# Patient Record
Sex: FEMALE | Race: WHITE | HISPANIC OR LATINO | Employment: UNEMPLOYED | RURAL
[De-identification: names, ages, dates, MRNs, and addresses within clinical notes are randomized per-mention and may not be internally consistent; named-entity substitution may affect disease eponyms.]

---

## 2021-01-18 ENCOUNTER — TELEPHONE (OUTPATIENT)
Dept: FAMILY MEDICINE CLINIC | Facility: CLINIC | Age: 26
End: 2021-01-18

## 2021-01-18 NOTE — TELEPHONE ENCOUNTER
Tied calling to confirm patient is establishing care with Dr Rangel needs to wear facemask and prescreen,  Also needs to be remind to bring photo id, ins card medication (RX or OTC) and name and phone number of any provider seen within the last 2 years.  No answer no voicemail   HUB to read

## 2021-01-19 ENCOUNTER — OFFICE VISIT (OUTPATIENT)
Dept: FAMILY MEDICINE CLINIC | Facility: CLINIC | Age: 26
End: 2021-01-19

## 2021-01-19 VITALS
OXYGEN SATURATION: 98 % | HEIGHT: 64 IN | RESPIRATION RATE: 20 BRPM | SYSTOLIC BLOOD PRESSURE: 110 MMHG | DIASTOLIC BLOOD PRESSURE: 82 MMHG | TEMPERATURE: 97.3 F | BODY MASS INDEX: 38.58 KG/M2 | WEIGHT: 226 LBS | HEART RATE: 103 BPM

## 2021-01-19 DIAGNOSIS — N93.9 ABNORMAL UTERINE BLEEDING (AUB): Primary | ICD-10-CM

## 2021-01-19 LAB
B-HCG UR QL: NEGATIVE
INTERNAL NEGATIVE CONTROL: NEGATIVE
INTERNAL POSITIVE CONTROL: NEGATIVE
Lab: NORMAL

## 2021-01-19 PROCEDURE — 87660 TRICHOMONAS VAGIN DIR PROBE: CPT | Performed by: STUDENT IN AN ORGANIZED HEALTH CARE EDUCATION/TRAINING PROGRAM

## 2021-01-19 PROCEDURE — 87510 GARDNER VAG DNA DIR PROBE: CPT | Performed by: STUDENT IN AN ORGANIZED HEALTH CARE EDUCATION/TRAINING PROGRAM

## 2021-01-19 PROCEDURE — 99203 OFFICE O/P NEW LOW 30 MIN: CPT | Performed by: STUDENT IN AN ORGANIZED HEALTH CARE EDUCATION/TRAINING PROGRAM

## 2021-01-19 PROCEDURE — 87480 CANDIDA DNA DIR PROBE: CPT | Performed by: STUDENT IN AN ORGANIZED HEALTH CARE EDUCATION/TRAINING PROGRAM

## 2021-01-19 PROCEDURE — 81025 URINE PREGNANCY TEST: CPT | Performed by: STUDENT IN AN ORGANIZED HEALTH CARE EDUCATION/TRAINING PROGRAM

## 2021-01-19 NOTE — PROGRESS NOTES
"   Subjective:  Christ Sanchez is a 25 y.o. female who presents for establish care and AUB    AUB; states usually very regular with periods, LMP was -, states had coitus friday morning and afternoon, has some faint pink discharge and intermittent blot clot over the weekend. States that this was unusual for her, has begun her period.  Denied dyspareunia, dysuria, no vaginal discharge, no abdominal pain. States had her tubes tied after her last pregnancy ~ 5 years ago.  States last pap smear was then as well. Has been with  for 9 years, monogamous.      Vitals:    Vitals:    21 1436   BP: 110/82   Pulse: 103   Resp: 20   Temp: 97.3 °F (36.3 °C)   SpO2: 98%   Weight: 103 kg (226 lb)   Height: 162.6 cm (64\")       Body mass index is 38.79 kg/m².    Review of Systems  Review of Systems   Constitutional: Negative for appetite change and fever.   HENT: Negative for sore throat.    Eyes: Negative for discharge and visual disturbance.   Respiratory: Negative for cough and shortness of breath.    Cardiovascular: Negative for chest pain, palpitations and leg swelling.   Gastrointestinal: Negative for abdominal pain, diarrhea and vomiting.   Genitourinary: Negative for dysuria.   Skin: Negative for rash.   Neurological: Negative for light-headedness and headaches.   Psychiatric/Behavioral: Negative for agitation.       There is no problem list on file for this patient.    Past Surgical History:   Procedure Laterality Date   •  SECTION      x2     Social History     Socioeconomic History   • Marital status:      Spouse name: Not on file   • Number of children: Not on file   • Years of education: Not on file   • Highest education level: Not on file   Tobacco Use   • Smoking status: Never Smoker   • Smokeless tobacco: Never Used   Substance and Sexual Activity   • Alcohol use: Never     Frequency: Never   • Drug use: Never     History reviewed. No pertinent family history.  No results found " for any previous visit.      No image results found.    @[facility]@    There is no immunization history on file for this patient.    The following portions of the patient's history were reviewed and updated as appropriate: allergies, current medications, past family history, past medical history, past social history, past surgical history and problem list.        Physical Exam  Physical Exam  Constitutional:       Appearance: Normal appearance.   HENT:      Head: Normocephalic and atraumatic.      Right Ear: Tympanic membrane and ear canal normal.      Left Ear: Tympanic membrane and ear canal normal.   Eyes:      General:         Right eye: No discharge.         Left eye: No discharge.      Conjunctiva/sclera: Conjunctivae normal.   Neck:      Musculoskeletal: Normal range of motion.   Cardiovascular:      Rate and Rhythm: Normal rate and regular rhythm.      Pulses: Normal pulses.      Heart sounds: Normal heart sounds. No murmur.   Pulmonary:      Effort: Pulmonary effort is normal. No respiratory distress.      Breath sounds: Normal breath sounds.   Abdominal:      General: There is no distension.      Palpations: Abdomen is soft.      Tenderness: There is no abdominal tenderness.   Genitourinary:     Comments: Deferred secondary to children in room.  Lymphadenopathy:      Cervical: No cervical adenopathy.   Neurological:      Mental Status: She is alert. Mental status is at baseline.   Psychiatric:         Mood and Affect: Mood normal.         Behavior: Behavior normal.         Assessment/Plan    Diagnosis Plan   1. Abnormal uterine bleeding (AUB)  POCT pregnancy, urine    Chlamydia trachomatis, Neisseria gonorrhoeae, PCR - Urine, Urine, Clean Catch    Gardnerella vaginalis, Trichomonas vaginalis, Candida albicans, DNA - Swab, Vagina      Orders Placed This Encounter   Procedures   • Chlamydia trachomatis, Neisseria gonorrhoeae, PCR - Urine, Urine, Clean Catch   • Gardnerella vaginalis, Trichomonas vaginalis,  Candida albicans, DNA - Swab, Vagina   • POCT pregnancy, urine     Patient unable to do Pap smear,  exam today as children are with her.  No flank pain, abdominal pain, vaginal discharge, UPT today was negative.  Will refer to OB/GYN, if unable to see them promptly she is to schedule appointment for follow-up at our clinic for Pap smear,  exam.  Self swab as above to rule out STIs, patient agreeable to plan, no red flags.      This document has been electronically signed by Lawrence Rangel MD on January 19, 2021 15:52 CST

## 2021-01-20 LAB
CANDIDA ALBICANS: NEGATIVE
GARDNERELLA VAGINALIS: POSITIVE
T VAGINALIS DNA VAG QL PROBE+SIG AMP: NEGATIVE

## 2021-01-20 PROCEDURE — 87491 CHLMYD TRACH DNA AMP PROBE: CPT | Performed by: STUDENT IN AN ORGANIZED HEALTH CARE EDUCATION/TRAINING PROGRAM

## 2021-01-20 PROCEDURE — 87591 N.GONORRHOEAE DNA AMP PROB: CPT | Performed by: STUDENT IN AN ORGANIZED HEALTH CARE EDUCATION/TRAINING PROGRAM

## 2021-01-20 PROCEDURE — 87661 TRICHOMONAS VAGINALIS AMPLIF: CPT | Performed by: STUDENT IN AN ORGANIZED HEALTH CARE EDUCATION/TRAINING PROGRAM

## 2021-01-21 DIAGNOSIS — B96.89 BV (BACTERIAL VAGINOSIS): Primary | ICD-10-CM

## 2021-01-21 DIAGNOSIS — N76.0 BV (BACTERIAL VAGINOSIS): Primary | ICD-10-CM

## 2021-01-21 LAB
C TRACH RRNA CVX QL NAA+PROBE: NEGATIVE
N GONORRHOEA RRNA SPEC QL NAA+PROBE: NEGATIVE
TRICHOMONAS VAGINALIS PCR: NEGATIVE

## 2021-01-21 RX ORDER — METRONIDAZOLE 500 MG/1
500 TABLET ORAL 2 TIMES DAILY
Qty: 14 TABLET | Refills: 0 | Status: SHIPPED | OUTPATIENT
Start: 2021-01-21 | End: 2021-03-31

## 2021-02-08 ENCOUNTER — TELEPHONE (OUTPATIENT)
Dept: FAMILY MEDICINE CLINIC | Facility: CLINIC | Age: 26
End: 2021-02-08

## 2021-03-31 ENCOUNTER — OFFICE VISIT (OUTPATIENT)
Dept: OBSTETRICS AND GYNECOLOGY | Facility: CLINIC | Age: 26
End: 2021-03-31

## 2021-03-31 VITALS
WEIGHT: 230 LBS | BODY MASS INDEX: 39.27 KG/M2 | SYSTOLIC BLOOD PRESSURE: 120 MMHG | DIASTOLIC BLOOD PRESSURE: 82 MMHG | HEIGHT: 64 IN

## 2021-03-31 DIAGNOSIS — N92.6 MISSED PERIOD: ICD-10-CM

## 2021-03-31 DIAGNOSIS — Z01.419 ENCOUNTER FOR GYNECOLOGICAL EXAMINATION WITHOUT ABNORMAL FINDING: Primary | ICD-10-CM

## 2021-03-31 LAB
B-HCG UR QL: NEGATIVE
INTERNAL NEGATIVE CONTROL: NEGATIVE
INTERNAL POSITIVE CONTROL: POSITIVE
Lab: NORMAL

## 2021-03-31 PROCEDURE — 99385 PREV VISIT NEW AGE 18-39: CPT | Performed by: NURSE PRACTITIONER

## 2021-03-31 PROCEDURE — 81025 URINE PREGNANCY TEST: CPT | Performed by: NURSE PRACTITIONER

## 2021-03-31 NOTE — PROGRESS NOTES
Subjective   Christ Sanchez is a 26 y.o. presents for annual exam. States that her periods are usually 30 days apart but she has not had one in about 50 days.     LMP- 2/10/21  Last pap-  in [place]; no hx of abnormal  Last mammo- never 20 age    Moved to the  in November with her  and two children. States that he got a job in [place] after being unemployed in [place] for several months. States that she is no longer having to work since moving here and her two children are in school full-time. She is enjoying her new home and feels much less stressed than she was in [place].    Gynecologic Exam  The patient's pertinent negatives include no genital itching, genital lesions, genital odor, genital rash, missed menses, pelvic pain, vaginal bleeding or vaginal discharge. Pertinent negatives include no abdominal pain, chills, constipation, diarrhea, dysuria, fever, frequency or urgency. She is sexually active. No, her partner does not have an STD. She uses tubal ligation for contraception. Her menstrual history has been regular. Her past medical history is significant for a  section and miscarriage. There is no history of ovarian cysts or an STD.       The following portions of the patient's history were reviewed and updated as appropriate: allergies, current medications, past family history, past medical history, past social history, past surgical history and problem list.    Review of Systems   Constitutional: Negative for activity change, appetite change, chills, diaphoresis, fatigue, fever, unexpected weight gain and unexpected weight loss.   Respiratory: Negative for chest tightness and shortness of breath.    Cardiovascular: Negative for chest pain and palpitations.   Gastrointestinal: Negative for abdominal distention, abdominal pain, constipation and diarrhea.   Endocrine: Negative.    Genitourinary: Positive for menstrual problem. Negative for amenorrhea, breast discharge, breast lump, breast  pain, decreased libido, difficulty urinating, dyspareunia, dysuria, frequency, missed menses, pelvic pain, pelvic pressure, urgency, urinary incontinence, vaginal bleeding, vaginal discharge and vaginal pain.   Musculoskeletal: Negative for myalgias.   Skin: Negative for color change, dry skin and skin lesions.   Neurological: Negative for light-headedness and headache.   Psychiatric/Behavioral: Negative for agitation, dysphoric mood, sleep disturbance, depressed mood and stress. The patient is not nervous/anxious.        Objective   Physical Exam  Vitals and nursing note reviewed. Exam conducted with a chaperone present.   Constitutional:       General: She is awake. She is not in acute distress.     Appearance: Normal appearance. She is well-developed and well-groomed. She is obese. She is not ill-appearing, toxic-appearing or diaphoretic.   Neck:      Thyroid: No thyroid mass, thyromegaly or thyroid tenderness.   Cardiovascular:      Rate and Rhythm: Normal rate and regular rhythm.      Heart sounds: Normal heart sounds.   Pulmonary:      Effort: Pulmonary effort is normal.      Breath sounds: Normal breath sounds.   Chest:      Breasts: Rashad Score is 5. Breasts are symmetrical.         Right: Normal. No swelling, bleeding, inverted nipple, mass, nipple discharge, skin change or tenderness.         Left: Normal. No swelling, bleeding, inverted nipple, mass, nipple discharge, skin change or tenderness.   Abdominal:      General: Bowel sounds are normal. There is no distension.      Palpations: Abdomen is soft.      Tenderness: There is no abdominal tenderness.   Genitourinary:     General: Normal vulva.      Exam position: Lithotomy position.      Rashad stage (genital): 5.      Labia:         Right: No rash, tenderness, lesion or injury.         Left: No rash, tenderness, lesion or injury.       Urethra: No prolapse, urethral pain, urethral swelling or urethral lesion.      Vagina: Normal.      Cervix: Normal.       Uterus: Normal.       Adnexa: Right adnexa normal and left adnexa normal.        Right: No mass, tenderness or fullness.          Left: No mass, tenderness or fullness.        Comments: Pap obtained  Lymphadenopathy:      Upper Body:      Right upper body: No supraclavicular, axillary or pectoral adenopathy.      Left upper body: No supraclavicular, axillary or pectoral adenopathy.      Lower Body: No right inguinal adenopathy. No left inguinal adenopathy.   Skin:     General: Skin is warm and dry.   Neurological:      Mental Status: She is alert and oriented to person, place, and time.      Gait: Gait is intact.   Psychiatric:         Attention and Perception: Attention and perception normal.         Mood and Affect: Mood and affect normal.         Speech: Speech normal.         Behavior: Behavior normal. Behavior is cooperative.           Assessment/Plan   Diagnoses and all orders for this visit:    1. Encounter for gynecological examination without abnormal finding (Primary)  -     Liquid-based Pap Smear, Screening    2. Missed period  -     POC Pregnancy, Urine      UPT is negative in office today. If periods continue to be irregular she is to RTC, otherwise give it a few months to regulate again. May just be off due to the recent move. Reviewed cervical cancer screening recommendations.

## 2021-04-05 LAB
LAB AP CASE REPORT: NORMAL
PATH INTERP SPEC-IMP: NORMAL

## 2021-04-20 ENCOUNTER — OFFICE VISIT (OUTPATIENT)
Dept: FAMILY MEDICINE CLINIC | Facility: CLINIC | Age: 26
End: 2021-04-20

## 2021-04-20 VITALS
SYSTOLIC BLOOD PRESSURE: 130 MMHG | RESPIRATION RATE: 20 BRPM | HEIGHT: 64 IN | TEMPERATURE: 97.1 F | BODY MASS INDEX: 39.16 KG/M2 | HEART RATE: 83 BPM | DIASTOLIC BLOOD PRESSURE: 88 MMHG | OXYGEN SATURATION: 100 % | WEIGHT: 229.4 LBS

## 2021-04-20 DIAGNOSIS — J02.9 SORE THROAT: Primary | ICD-10-CM

## 2021-04-20 DIAGNOSIS — R05.9 COUGH: ICD-10-CM

## 2021-04-20 LAB
EXPIRATION DATE: NORMAL
INTERNAL CONTROL: NORMAL
Lab: NORMAL
S PYO AG THROAT QL: NEGATIVE
SARS-COV-2 N GENE RESP QL NAA+PROBE: NOT DETECTED

## 2021-04-20 PROCEDURE — 87880 STREP A ASSAY W/OPTIC: CPT | Performed by: NURSE PRACTITIONER

## 2021-04-20 PROCEDURE — 87635 SARS-COV-2 COVID-19 AMP PRB: CPT | Performed by: NURSE PRACTITIONER

## 2021-04-20 PROCEDURE — 99214 OFFICE O/P EST MOD 30 MIN: CPT | Performed by: NURSE PRACTITIONER

## 2021-04-20 PROCEDURE — 87081 CULTURE SCREEN ONLY: CPT | Performed by: NURSE PRACTITIONER

## 2021-04-20 RX ORDER — LORATADINE AND PSEUDOEPHEDRINE SULFATE 5; 120 MG/1; MG/1
1 TABLET, EXTENDED RELEASE ORAL 2 TIMES DAILY
Qty: 30 TABLET | Refills: 0 | Status: SHIPPED | OUTPATIENT
Start: 2021-04-20 | End: 2021-07-09

## 2021-04-20 NOTE — PATIENT INSTRUCTIONS

## 2021-04-20 NOTE — PROGRESS NOTES
Chief Complaint  Sore Throat, Nasal Congestion, and Back Pain (upper back)    Subjective    History of Present Illness      Christ Sanchez presents to [facility] for   Sore Throat   This is a new problem. The current episode started in the past 7 days (x 2 days). The problem has been unchanged. There has been no fever. The pain is at a severity of 4/10. The pain is mild. Associated symptoms include congestion and coughing (only in AM). Pertinent negatives include no abdominal pain, ear discharge, ear pain, headaches, hoarse voice, plugged ear sensation, neck pain, shortness of breath, swollen glands or trouble swallowing. She has had no exposure to strep.   Back Pain  This is a new problem. The current episode started in the past 7 days (x 2 days). The problem has been gradually worsening since onset. The quality of the pain is described as aching. The pain is at a severity of 4/10. The pain is mild. The symptoms are aggravated by coughing. Pertinent negatives include no abdominal pain, chest pain, dysuria, fever or headaches. Risk factors include obesity. She has tried nothing for the symptoms.        Objective     Physical Exam  Vitals and nursing note reviewed.   Constitutional:       General: She is not in acute distress.     Appearance: Normal appearance. She is normal weight. She is not ill-appearing.   HENT:      Head: Normocephalic and atraumatic.      Right Ear: Tympanic membrane, ear canal and external ear normal.      Left Ear: Tympanic membrane, ear canal and external ear normal.      Nose: Nose normal. No rhinorrhea.      Mouth/Throat:      Mouth: Mucous membranes are moist.      Pharynx: Oropharynx is clear. Posterior oropharyngeal erythema present.   Eyes:      Conjunctiva/sclera: Conjunctivae normal.      Pupils: Pupils are equal, round, and reactive to light.   Cardiovascular:      Rate and Rhythm: Normal rate and regular rhythm.      Heart sounds: Normal heart sounds.  "  Pulmonary:      Effort: Pulmonary effort is normal.      Breath sounds: Examination of the right-upper field reveals decreased breath sounds. Examination of the left-upper field reveals decreased breath sounds. Examination of the right-middle field reveals decreased breath sounds. Examination of the left-middle field reveals decreased breath sounds. Examination of the right-lower field reveals decreased breath sounds. Examination of the left-lower field reveals decreased breath sounds. Decreased breath sounds present. No wheezing or rhonchi.          Comments: Pt reports pain in indicated area of back  Musculoskeletal:         General: Normal range of motion.      Cervical back: Normal range of motion.   Lymphadenopathy:      Cervical: Cervical adenopathy (anterior cervical and submandibular) present.   Skin:     General: Skin is warm and dry.   Neurological:      General: No focal deficit present.      Mental Status: She is alert and oriented to person, place, and time.   Psychiatric:         Mood and Affect: Mood normal.         Behavior: Behavior normal.        Result Review  Data Reviewed:          Vital Signs:   /88 (BP Location: Right arm, Patient Position: Sitting, Cuff Size: Adult)   Pulse 83   Temp 97.1 °F (36.2 °C) (Temporal)   Resp 20   Ht 162.6 cm (64\")   Wt 104 kg (229 lb 6.4 oz)   SpO2 100%   BMI 39.38 kg/m²           Lab Results   Component Value Date    RAPSCRN Negative 04/20/2021       Assessment and Plan    Problem List Items Addressed This Visit     None      Visit Diagnoses     Sore throat    -  Primary    Relevant Orders    POCT rapid strep A (Completed)    Beta Strep Culture, Throat - , Throat    Cough        Relevant Medications    guaiFENesin (Mucinex) 600 MG 12 hr tablet    loratadine-pseudoephedrine (Claritin-D 12 Hour) 5-120 MG per 12 hr tablet    Other Relevant Orders    COVID-19, [facility] IN-HOUSE, NP SWAB IN TRANSPORT MEDIA 8-10 HR TAT - Swab, Nasopharynx    XR Chest PA & " Lateral        - RST negative - COVID test obtained - pt advised to home quarantine until results avail - can take 12-24 hours for result  - Will send for throat culture based on presenting sx and exam findings - pt agreeable to throat cx  - Discussed rest, increasing oral hydration - RX for mucinex and claritin D prescribed - take as directed  - CXR ordered to eval cough, diminished lung sounds and back pain - r/o pneumonia v/s other resp infection  - ECZ sample pack provided with detailed instructions of use given  - Advise f/u with PCP, Dr. Rangel, as scheduled or if sx persist or worsen      Follow Up    Return if symptoms worsen or fail to improve.  Patient was given instructions and counseling regarding her condition or for health maintenance advice. Please see specific information pulled into the AVS if appropriate            .  This document has been electronically signed by MARIE Moran on April 20, 2021 09:55 CDT

## 2021-04-22 LAB — BACTERIA SPEC AEROBE CULT: NORMAL

## 2021-07-05 DIAGNOSIS — R05.9 COUGH: ICD-10-CM

## 2021-07-09 RX ORDER — LORATADINE, PSEUDOEPHEDRINE SULFATE 5; 120 MG/1; MG/1
TABLET, FILM COATED, EXTENDED RELEASE ORAL
Qty: 30 TABLET | Refills: 0 | Status: SHIPPED | OUTPATIENT
Start: 2021-07-09 | End: 2022-01-28

## 2021-08-03 ENCOUNTER — TELEPHONE (OUTPATIENT)
Dept: FAMILY MEDICINE CLINIC | Facility: CLINIC | Age: 26
End: 2021-08-03

## 2021-08-03 ENCOUNTER — OFFICE VISIT (OUTPATIENT)
Dept: FAMILY MEDICINE CLINIC | Facility: CLINIC | Age: 26
End: 2021-08-03

## 2021-08-03 VITALS
HEART RATE: 88 BPM | HEIGHT: 64 IN | WEIGHT: 223.7 LBS | DIASTOLIC BLOOD PRESSURE: 80 MMHG | TEMPERATURE: 97.7 F | OXYGEN SATURATION: 99 % | SYSTOLIC BLOOD PRESSURE: 115 MMHG | BODY MASS INDEX: 38.19 KG/M2

## 2021-08-03 DIAGNOSIS — M25.562 LEFT ANTERIOR KNEE PAIN: Primary | ICD-10-CM

## 2021-08-03 PROCEDURE — 99213 OFFICE O/P EST LOW 20 MIN: CPT | Performed by: STUDENT IN AN ORGANIZED HEALTH CARE EDUCATION/TRAINING PROGRAM

## 2021-08-03 RX ORDER — DICLOFENAC SODIUM 75 MG/1
75 TABLET, DELAYED RELEASE ORAL 2 TIMES DAILY
COMMUNITY
Start: 2021-07-25 | End: 2022-01-28

## 2021-08-03 RX ORDER — MELOXICAM 15 MG/1
15 TABLET ORAL DAILY PRN
Qty: 60 TABLET | Refills: 1 | Status: SHIPPED | OUTPATIENT
Start: 2021-08-03 | End: 2022-01-28

## 2021-08-03 NOTE — TELEPHONE ENCOUNTER
Pt was seen at [facility] on 7/25. Called [facility] and was told I have to request records via fax. Faxed request to [phone].

## 2021-08-03 NOTE — PROGRESS NOTES
"Subjective:  Christ Sanchez is a 26 y.o. 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 Denied acute trauma, but does feel some pain and clicking along the patella. No swelling, erythema or warmth. No alleviating factors, aggravated by standing and movement. No instability unless pain is very intense. Not taking any medications. [facility], was given steroid dose pack with limited relief.    There is no problem list on file for this patient.    Vitals:    Vitals:    21 1006   BP: 115/80   BP Location: Left arm   Patient Position: Sitting   Cuff Size: Large Adult   Pulse: 88   Temp: 97.7 °F (36.5 °C)   SpO2: 99%   Weight: 101 kg (223 lb 11.2 oz)   Height: 162.6 cm (64\")     Body mass index is 38.4 kg/m².      Current Outpatient Medications:   •  Allergy/Congestion Relief 5-120 MG per 12 hr tablet, Take 1 tablet by mouth twice daily, Disp: 30 tablet, Rfl: 0  •  diclofenac (VOLTAREN) 75 MG EC tablet, Take 75 mg by mouth 2 (Two) Times a Day., Disp: , Rfl:   •  meloxicam (MOBIC) 15 MG tablet, Take 1 tablet by mouth Daily As Needed for Mild Pain  or Moderate Pain . please take daily for 2 weeks, as needed thereafter., Disp: 60 tablet, Rfl: 1    There is no problem list on file for this patient.    Past Surgical History:   Procedure Laterality Date   •  SECTION      x2     Social History     Socioeconomic History   • Marital status:      Spouse name: Not on file   • Number of children: Not on file   • Years of education: Not on file   • Highest education level: Not on file   Tobacco Use   • Smoking status: Never Smoker   • Smokeless tobacco: Never Used   Substance and Sexual Activity   • Alcohol use: Never   • Drug use: Never   • Sexual activity: Yes     Partners: Male     Birth control/protection: Surgical     Comment: bilateral tubal ligation- clamps "     Family History   Problem Relation Age of Onset   • Hypertension Father    • Diabetes Father    • Hypertension Mother    • Diabetes Mother      Office Visit on 04/20/2021   Component Date Value Ref Range Status   • Rapid Strep A Screen 04/20/2021 Negative  Negative, VALID, INVALID, Not Performed Final   • Internal Control 04/20/2021 Passed  Passed Final   • Lot Number 04/20/2021 [id number]   Final   • Expiration Date 04/20/2021 10/23/2022   Final   • COVID19 04/20/2021 Not Detected  Not Detected - Ref. Range Final   • Throat Culture, Beta Strep 04/20/2021 No Beta Hemolytic Streptococcus Isolated   Final   Office Visit on 03/31/2021   Component Date Value Ref Range Status   • Case Report 03/31/2021    Final                    Value:Gynecologic Cytology Report                       Case: [id number]                                  Authorizing Provider:  Mi Astudillo APRN       Collected:           03/31/2021 11:36 AM          Ordering Location[facility]     [facility]     Received:            03/31/2021 03:26 PM                                 [facility]                                                                 First Screen:          Yoon Rebollar                                                               Specimen:    Sendout to [facility], Cervix                                                                    • Interpretation 03/31/2021 See attached report   Final   • HCG, Urine, QL 03/31/2021 Negative  Negative Final   • Lot Number 03/31/2021 [id number]   Final   • Internal Positive Control 03/31/2021 Positive   Final   • Internal Negative Control 03/31/2021 Negative   Final      XR Knee 3 View Left  Narrative: Three view left knee  Standing AP knees    HISTORY: Left knee pain    AP, lateral and oblique views of the left knee obtained.  Standing AP view of each knee obtained.    COMPARISON: None    FINDINGS:   No fracture or dislocation.  No significant joint space narrowing.  No left suprapatellar  effusion.  No other osseous or articular abnormality.  Impression: CONCLUSION:  Normal knees    [id number]    Electronically signed by:  Roman Reyes MD  8/3/2021 4:59 PM CDT  Workstation: [id number]  XR Knee Bilateral AP Standing  Narrative: Three view left knee  Standing AP knees    HISTORY: Left knee pain    AP, lateral and oblique views of the left knee obtained.  Standing AP view of each knee obtained.    COMPARISON: None    FINDINGS:   No fracture or dislocation.  No significant joint space narrowing.  No left suprapatellar effusion.  No other osseous or articular abnormality.  Impression: CONCLUSION:  Normal knees    [id number]    Electronically signed by:  Roman Reyes MD  8/3/2021 4:59 PM CDT  Workstation: [id number]      @[facility]@    There is no immunization history on file for this patient.  The following portions of the patient's history were reviewed and updated as appropriate: allergies, current medications, past family history, past medical history, past social history, past surgical history and problem list.    PHQ-9 Total Score: 0           Physical Exam  Constitutional:       Appearance: Normal appearance.   HENT:      Head: Normocephalic and atraumatic.      Right Ear: External ear normal.      Left Ear: External ear normal.   Eyes:      General:         Right eye: No discharge.         Left eye: No discharge.      Conjunctiva/sclera: Conjunctivae normal.   Cardiovascular:      Rate and Rhythm: Normal rate and regular rhythm.      Pulses: Normal pulses.      Heart sounds: Normal heart sounds. No murmur heard.     Pulmonary:      Effort: Pulmonary effort is normal. No respiratory distress.      Breath sounds: Normal breath sounds.   Abdominal:      General: There is no distension.      Palpations: Abdomen is soft.      Tenderness: There is no abdominal tenderness.   Musculoskeletal:      Cervical back: Normal range of motion.      Right knee: No bony tenderness. Normal range of motion. No LCL laxity, ACL  laxity or PCL laxity. Normal alignment.      Instability Tests: Anterior Lachman test negative.      Left knee: No bony tenderness. Normal range of motion. No LCL laxity, ACL laxity or PCL laxity.Normal alignment.      Instability Tests: Anterior Lachman test negative.      Right lower leg: No deformity. No edema.      Left lower leg: No deformity. No edema.      Comments: Negative Thessaly test bilaterally.  Slight tenderness to palpation over lateral quadriceps insertion on patella.  No gross deformity, no abnormal laxity, erythema, swelling, skin discoloration.    Lymphadenopathy:      Cervical: No cervical adenopathy.   Neurological:      Mental Status: She is alert. Mental status is at baseline.   Psychiatric:         Mood and Affect: Mood normal.         Behavior: Behavior normal.         Assessment/Plan    Diagnosis Plan   1. Left anterior knee pain  XR Knee Bilateral AP Standing    XR Knee 3 View Left    Ambulatory Referral to Physical Therapy Evaluate and treat    meloxicam (MOBIC) 15 MG tablet      Orders Placed This Encounter   Procedures   • XR Knee Bilateral AP Standing     Standing Status:   Future     Number of Occurrences:   1     Standing Expiration Date:   8/3/2022     Order Specific Question:   Reason for Exam:     Answer:   left knee pain     Order Specific Question:   Patient Pregnant     Answer:   No     Order Specific Question:   Release to patient     Answer:   Immediate   • XR Knee 3 View Left     Standing Status:   Future     Number of Occurrences:   1     Standing Expiration Date:   8/3/2022     Order Specific Question:   Reason for Exam:     Answer:   anterior left knee pain     Order Specific Question:   Patient Pregnant     Answer:   No     Order Specific Question:   Release to patient     Answer:   Immediate   • Ambulatory Referral to Physical Therapy Evaluate and treat     Referral Priority:   Routine     Referral Type:   Physical Therapy     Referral Reason:   Specialty Services  Required     Requested Specialty:   Physical Therapy     Number of Visits Requested:   1     Anterior knee pain; likely patellofemoral pain syndrome, instructed on exercise modifications, importance of physical therapy, NSAIDs for relief.  Will obtain x-ray as above, consider advanced imaging at follow-up if unimproved.  Patient agreeable to plan, no instability, history of trauma, exam reassuring.          This document has been electronically signed by Lawrence Rangel MD on August 3, 2021 20:33 CDT

## 2021-08-04 ENCOUNTER — TELEPHONE (OUTPATIENT)
Dept: FAMILY MEDICINE CLINIC | Facility: CLINIC | Age: 26
End: 2021-08-04

## 2021-08-04 NOTE — TELEPHONE ENCOUNTER
----- Message from Lawrence Rangel MD sent at 8/3/2021  6:05 PM CDT -----  X-ray normal, reassuring.

## 2021-09-02 ENCOUNTER — HOSPITAL ENCOUNTER (OUTPATIENT)
Dept: PHYSICAL THERAPY | Facility: HOSPITAL | Age: 26
Setting detail: THERAPIES SERIES
Discharge: HOME | End: 2021-09-02

## 2021-09-02 DIAGNOSIS — M25.562 LEFT ANTERIOR KNEE PAIN: Primary | ICD-10-CM

## 2021-09-02 PROCEDURE — 97162 PT EVAL MOD COMPLEX 30 MIN: CPT | Performed by: PHYSICAL THERAPIST

## 2021-09-02 NOTE — THERAPY EVALUATION
Outpatient Physical Therapy Ortho Initial Evaluation  [facility]     Patient Name: Christ Sanchez  : 1995  MRN: [id number]  Today's Date: 2021      Visit Date: 2021    Attendance:   N/A% Improvement  Next MD appt: JOAQUIN  Recertification: 2021    Therapy Diagnosis: L anterior knee pain/PFS         History reviewed. No pertinent past medical history.     Past Surgical History:   Procedure Laterality Date   •  SECTION      x2       Visit Dx:     ICD-10-CM ICD-9-CM   1. Left anterior knee pain  M25.562 719.46         Patient History     Row Name 21 0800             History    Chief Complaint  Pain  -AJ      Type of Pain  Knee pain  -AJ      Date Current Problem(s) Began  -- ~2 months  -AJ      Brief Description of Current Complaint  PATIENT REPORTS SHE STARTED HAVING LATERAL PAIN IN UPPER THIGH AND THEN IT STARTED MOVING SARAI INTO THE KNEE AND LATERAL KNEE. She reports she went to urgent care and got medication for an antiinflammatory and some pain medication She reports if she pivots on a fixed foot it will pop which is sometimes painful. She also reports squatting is painful.  -AJ      Previous treatment for THIS PROBLEM  Medication  -AJ      Patient/Caregiver Goals  Relieve pain;Return to prior level of function  -AJ      Patient/Caregiver Goals Comment  Patient wishes to learn and make it feel better.  -AJ      Current Tobacco Use  None  -AJ      Smoking Status  Never Smoker  -AJ      Patient's Rating of General Health  Good  -AJ      Occupation/sports/leisure activities  Occupation:   -AJ      Patient seeing anyone else for problem(s)?  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  X-ray  -AJ      Results of Clinical Tests  negative  -AJ      History of Previous Related Injuries  None  -AJ      Are you or can you be pregnant  No  -AJ         Pain     Pain Location  Knee  -AJ      Pain at Present  2  -AJ      Pain at Best   0  -AJ      Pain Frequency  Intermittent  -AJ      Pain Description  Aching;Discomfort;Pressure pulling  -AJ      What Performance Factors Make the Current Problem(s) WORSE?  squatting, moving on fixed foot  -AJ      What Performance Factors Make the Current Problem(s) BETTER?  icy hot, elevate  -AJ      Difficulties at work?  squatting, standing  -AJ      Difficulties with recreational activities?  Playing with kids 6 and 8 years old  -AJ        User Key  (r) = Recorded By, (t) = Taken By, (c) = Cosigned By    Initials Name Provider Type    Edwina Smart, PT DPT Physical Therapist          PT Ortho     Row Name 09/02/21 0800       Subjective Comments    Subjective Comments  See history section.  -AJ       Subjective Pain    Able to rate subjective pain?  yes  -AJ    Pre-Treatment Pain Level  2  -AJ    Post-Treatment Pain Level  2  -AJ       Posture/Observations    Alignment Options  Genu valgus  -AJ    Genu valgus  Bilateral:;Mild;Increased  -AJ    Posture/Observations Comments  FWB, non-antalgic gait, no distress.  -AJ       Knee Palpation    Biceps Femoris  Left:;Tender distal laterally  -AJ       Patellar Accessory Motions    Superior glide  Right:;Left:;WNL  -AJ    Inferior glide  Right:;Left:;WNL  -AJ    Medial glide  Right:;Left:;WNL  -AJ    Lateral glide  Right:;Left:;WNL  -AJ    Lateral tilt  Right:;Left:;WNL  -AJ       Knee Special Tests    Anterior drawer (ACL lesion)  Bilateral:;Negative  -AJ    Lachman’s (ACL lesion)  Bilateral:;Negative  -AJ    Posterior drawer (PCL lesion)  Bilateral:;Negative  -AJ    Posterior sag sign (PCL lesion)  Bilateral:;Negative  -AJ    Valgus stress (MCL lesion)  Bilateral:;Negative  -AJ    Varus stress (LCL lesion)  Bilateral:;Negative  -AJ    Pivot shift test (ACL lesion)  Bilateral:;Negative  -AJ    Apley’s distraction test (meniscal lesion vs OA)  Bilateral:;Negative  -AJ    Apley’s compression test (meniscal lesion vs OA)  Bilateral:;Negative  -AJ     Carlos’s test (meniscal lesion)  Bilateral:;Negative  -AJ    Patellar grind test (chondromalacia patella)  Bilateral:;Negative  -AJ    Liu test (chondromalacia patella)  Bilateral:;Negative  -AJ    Patellofemoral apprehension sign (instability)  Bilateral:;Negative  -AJ       General ROM    GENERAL ROM COMMENTS  AROM B knees 0°-130°  -AJ       MMT (Manual Muscle Testing)    General MMT Comments  B LE 5/5, L quad with mild discomfort  -AJ       Sensation    Sensation WNL?  WNL  -AJ    Light Touch  No apparent deficits  -AJ       Lower Extremity Flexibility    Hamstrings  Bilateral:;Mildly limited  -AJ    Hip Flexors  Bilateral:;Mildly limited  -AJ    Quadriceps  Bilateral:;Moderately limited  -AJ    ITB  Bilateral:;Mildly limited  -AJ       RLE Quick Girth (cm)    Other 1  38.8 cm knee join line  -AJ       LLE Quick Girth (cm)    Other 1  39.7 cm knee join line  -AJ       Pathomechanics    Pathomechanics Comments  Goes into B knee valgus with mini squat  -AJ      User Key  (r) = Recorded By, (t) = Taken By, (c) = Cosigned By    Initials Name Provider Type    Edwina Smart, PT DPT Physical Therapist                      Therapy Education  Education Details: HEP: St. ITB S; St. Quad S. Discussed use of ice for pain.  Given: HEP, Symptoms/condition management, Pain management  Program: New  How Provided: Verbal, Demonstration, Written  Provided to: Patient  Level of Understanding: Verbalized, Demonstrated     PT OP Goals     Row Name 09/02/21 0700          PT Short Term Goals    STG 1  Patient I with HEP and have additions/changes by next recertification  -AJ     STG 2  Patient able to perform sit to/from stand with 1 UE A = WB B LE x20, no increase in pain.  -     STG 3  Patient able to perform 20 SLR with ER with no lag present.  -     STG 4  Patient able to ascend/descend 3 steps reciprocally with 1 hand rail assist, no increase in pain x10 reps.  -        Long Term Goals    LTG 1  Patient to  "ahve improved B quad flexibility with heels <= 2\" from buttoccks B in prone.  -     LTG 2  Patient able to ambulate 1/4 mile with no increase in pain  -     LTG 3  patient able to demonstrate proper standing and pivoting without locking knees and moving feet for work simulation.  -     LTG 4  Patient able ot make a full composite squat and return to standing x5 with no increase in pain.  -     LTG 5  Patient able to perform a MS x20 reps with no valgus collapse.  -     LTG 6  I with final HEP.  -        Time Calculation    PT Goal Re-Cert Due Date  09/23/21  -       User Key  (r) = Recorded By, (t) = Taken By, (c) = Cosigned By    Initials Name Provider Type    Edwina Smart, PT DPT Physical Therapist        Barriers to Rehab: The chronicity of this issue, The patient's obesity.    Safety Issues: None noted.      PT Assessment/Plan     Row Name 09/02/21 0700          PT Assessment    Functional Limitations  Limitation in home management;Limitations in community activities;Performance in self-care ADL;Performance in work activities  -     Impairments  Balance;Edema;Endurance;Impaired flexibility;Impaired muscle endurance;Impaired muscle length;Impaired muscle power;Pain;Posture  -     Assessment Comments  patient has a 2 month history of L knee pain located mainly in lateral thigh and reports popping with turning on fixed foot. Appears to be more patellar versus meniscus. Patient's insurance requires authorization prior to treatment beginning. Small HEP was established.  -     Rehab Potential  Good  -     Patient/caregiver participated in establishment of treatment plan and goals  Yes  -     Patient would benefit from skilled therapy intervention  Yes  -        PT Plan    PT Frequency  2x/week  -     Predicted Duration of Therapy Intervention (PT)  8-10 visits  -     Planned CPT's?  PT EVAL MOD COMPLELITY: 85589;PT THER PROC EA 15 MIN: 48732;PT THER ACT EA 15 MIN: 11505;PT " MANUAL THERAPY EA 15 MIN: 44381;PT NEUROMUSC RE-EDUCATION EA 15 MIN: 43201;PT GAIT TRAINING EA 15 MIN: 86830;PT SELF CARE/HOME MGMT/TRAIN EA 15: 05961;PT THER SUPP EA 15 MIN  -     Physical Therapy Interventions (Optional Details)  aquatics exercise;gait training;gross motor skills;home exercise program;joint mobilization;manual therapy techniques;modalities;neuromuscular re-education;patient/family education;ROM (Range of Motion);postural re-education;stair training;strengthening;stretching;transfer training  -     PT Plan Comments  Progress overall mechanics, strength, function, and flexibility.  -       User Key  (r) = Recorded By, (t) = Taken By, (c) = Cosigned By    Initials Name Provider Type    Edwina Smart, PT DPT Physical Therapist        Other therapeutic activities and/or exercises will be prescribed depending on the patient's progress or lack thereof.    OP Exercises     Row Name 09/02/21 0800             Subjective Comments    Subjective Comments  See history section.  -         Subjective Pain    Able to rate subjective pain?  yes  -      Pre-Treatment Pain Level  2  -      Post-Treatment Pain Level  2  -         Exercise 1    Exercise Name 1  L St. ITB S  -      Reps 1  1  -      Time 1  30 seconds  -         Exercise 2    Exercise Name 2  L St. Quad S  -      Reps 2  1  -      Time 2  30 seconds  -        User Key  (r) = Recorded By, (t) = Taken By, (c) = Cosigned By    Initials Name Provider Type    Edwina Smart, PT DPT Physical Therapist                        Outcome Measure Options: Lower Extremity Functional Scale (LEFS)  Lower Extremity Functional Index  Any of your usual work, housework or school activities: A little bit of difficulty  Your usual hobbies, recreational or sporting activities: A little bit of difficulty  Getting into or out of the bath: Moderate difficulty  Walking between rooms: A little bit of difficulty  Putting on your shoes  or socks: A little bit of difficulty  Squatting: Moderate difficulty  Lifting an object, like a bag of groceries from the floor: Moderate difficulty  Performing light activities around your home: A little bit of difficulty  Performing heavy activities around your home: Moderate difficulty  Getting into or out of a car: Moderate difficulty  Walking 2 blocks: Moderate difficulty  Walking a mile: Moderate difficulty  Going up or down 10 stairs (about 1 flight of stairs): Quite a bit of difficulty  Standing for 1 hour: Moderate difficulty  Sitting for 1 hour: Moderate difficulty  Running on even ground: A little bit of difficulty  Running on uneven ground: Moderate difficulty  Making sharp turns while running fast: Quite a bit of difficulty  Hopping: Quite a bit of difficulty  Rolling over in bed: Moderate difficulty  Total: 43      Time Calculation:     Start Time: 0745  Stop Time: 0815  Time Calculation (min): 30 min         PT G-Codes  Outcome Measure Options: Lower Extremity Functional Scale (LEFS)  Total: 43         This document has been electronically signed by Edwina Brantley, PT DPT, CSCS on September 2, 2021 08:36 CDT

## 2021-09-03 ENCOUNTER — HOSPITAL ENCOUNTER (OUTPATIENT)
Dept: PHYSICAL THERAPY | Facility: HOSPITAL | Age: 26
Setting detail: THERAPIES SERIES
Discharge: HOME | End: 2021-09-03

## 2021-09-03 DIAGNOSIS — M25.562 LEFT ANTERIOR KNEE PAIN: Primary | ICD-10-CM

## 2021-09-03 PROCEDURE — 97110 THERAPEUTIC EXERCISES: CPT

## 2021-09-03 NOTE — THERAPY TREATMENT NOTE
Outpatient Physical Therapy Ortho Treatment Note  [facility]     Patient Name: Christ Sanchez  : 1995  MRN: [id number]  Today's Date: 9/3/2021      Visit Date: 2021     Attendance: 2/2  Does not rate % Improvement  MD Visit: TBD  Recheck Date: 2021    Therapy Diagnosis: L Anterior Knee Pain/PFS    Visit Dx:    ICD-10-CM ICD-9-CM   1. Left anterior knee pain  M25.562 719.46       There is no problem list on file for this patient.       No past medical history on file.     Past Surgical History:   Procedure Laterality Date   •  SECTION      x2       PT Ortho     Row Name 21 1000       Subjective Pain    Able to rate subjective pain?  yes  -    Pre-Treatment Pain Level  0  -    Post-Treatment Pain Level  0  -       Posture/Observations    Posture/Observations Comments  tendency to hyperextend with step ups fwd  -      User Key  (r) = Recorded By, (t) = Taken By, (c) = Cosigned By    Initials Name Provider Type    Karen Lozano PTA Physical Therapy Assistant                      PT Assessment/Plan     Row Name 21 1000          PT Assessment    Assessment Comments  patient has some tendancies of hyperextension when performing step ups and thus has pain with coming out of step up. tactile and verbal cueing to decrease hyperextenion with step ups. patient is very receptive to cueing and is able to control hyperextension after. gives good effort throughout and has no increase in complaints of pain.   -        PT Plan    PT Frequency  2x/week  -     Predicted Duration of Therapy Intervention (PT)  8-10 visits  -     PT Plan Comments  next visit lateral step ups, add SLR   -       User Key  (r) = Recorded By, (t) = Taken By, (c) = Cosigned By    Initials Name Provider Type    Karen Lozano PTA Physical Therapy Assistant            OP Exercises     Row Name 21 1000             Subjective Comments    Subjective Comments  states that she feels well  "today.   -         Subjective Pain    Able to rate subjective pain?  yes  -      Pre-Treatment Pain Level  0  -      Post-Treatment Pain Level  0  -         Exercise 1    Exercise Name 1  Pro II LE's for ROM, endurance, strength  -      Time 1  10 minutes  -      Additional Comments  L 4.5  -MH         Exercise 2    Exercise Name 2  B St. HS S  -MH      Reps 2  2  -      Time 2  30 sec hold  -         Exercise 3    Exercise Name 3  B St. ITB S  -MH      Reps 3  2  -MH      Time 3  30 sec hold  -         Exercise 4    Exercise Name 4  B St. Quad S  -MH      Reps 4  2  -MH      Time 4  30 sec hold  -         Exercise 5    Exercise Name 5  Standing Turn Vs Pivot   -      Reps 5  10  -MH      Additional Comments  to educate on appropriate kinematics for LE's to avoid twisting knee  -         Exercise 6    Exercise Name 6  Step Up fwd 4 inch with hyperextension control  -      Reps 6  20  -MH         Exercise 7    Exercise Name 7  B Quad Sets with Hyperextension Control  -      Reps 7  20  -      Time 7  5 sec hold  -         Exercise 8    Exercise Name 8  SAQ with add squeeze  -      Reps 8  20  -      Time 8  5 sec hold  -        User Key  (r) = Recorded By, (t) = Taken By, (c) = Cosigned By    Initials Name Provider Type    Karen Lozano, PTA Physical Therapy Assistant                       PT OP Goals     Row Name 09/03/21 1000          PT Short Term Goals    STG 1  Patient I with HEP and have additions/changes by next recertification  -     STG 1 Progress  Met  -     STG 2  Patient able to perform sit to/from stand with 1 UE A = WB B LE x20, no increase in pain.  -     STG 3  Patient able to perform 20 SLR with ER with no lag present.  -     STG 4  Patient able to ascend/descend 3 steps reciprocally with 1 hand rail assist, no increase in pain x10 reps.  -        Long Term Goals    LTG 1  Patient to ahve improved B quad flexibility with heels <= 2\" from buttoccks B " in prone.  -     LTG 2  Patient able to ambulate 1/4 mile with no increase in pain  -     LTG 3  patient able to demonstrate proper standing and pivoting without locking knees and moving feet for work simulation.  -     LTG 4  Patient able ot make a full composite squat and return to standing x5 with no increase in pain.  -     LTG 5  Patient able to perform a MS x20 reps with no valgus collapse.  -     LTG 6  I with final HEP.  -        Time Calculation    PT Goal Re-Cert Due Date  09/23/21  -       User Key  (r) = Recorded By, (t) = Taken By, (c) = Cosigned By    Initials Name Provider Type     Karen Han PTA Physical Therapy Assistant          Therapy Education  Education Details: SAQ with add squeeze, quad sets with hyperextenson control  Given: HEP  Program: Reinforced, New  How Provided: Verbal, Demonstration, Written  Provided to: Patient  Level of Understanding: Verbalized, Teach back education performed, Demonstrated              Time Calculation:   Start Time: 1002  Stop Time: 1049  Time Calculation (min): 47 min  Total Timed Code Minutes- PT: 47 minute(s)  Therapy Charges for Today     Code Description Service Date Service Provider Modifiers Qty    04092419435 HC PT THER PROC EA 15 MIN 9/3/2021 Karen Han PTA GP 3                    Karen Han PTA  9/3/2021       This document has been electronically signed by Karen Han PTA on September 3, 2021 10:51 CDT

## 2021-09-07 ENCOUNTER — HOSPITAL ENCOUNTER (OUTPATIENT)
Dept: PHYSICAL THERAPY | Facility: HOSPITAL | Age: 26
Setting detail: THERAPIES SERIES
Discharge: HOME | End: 2021-09-07

## 2021-09-07 DIAGNOSIS — M25.562 LEFT ANTERIOR KNEE PAIN: Primary | ICD-10-CM

## 2021-09-07 PROCEDURE — 97110 THERAPEUTIC EXERCISES: CPT

## 2021-09-07 NOTE — THERAPY TREATMENT NOTE
"    Outpatient Physical Therapy Ortho Treatment Note  [facility]     Patient Name: Christ Sanchez  : 1995  MRN: [id number]  Today's Date: 2021      Visit Date: 2021     Attendance: 3/3  \"doing better\" % Improvement  MD Visit: TBD  Recheck Date: 2021    Therapy Diagnosis: L Anterior Knee Pain/PFS    Visit Dx:    ICD-10-CM ICD-9-CM   1. Left anterior knee pain  M25.562 719.46       There is no problem list on file for this patient.       No past medical history on file.     Past Surgical History:   Procedure Laterality Date   •  SECTION      x2       PT Ortho     Row Name 21       Subjective Comments    Subjective Comments  states that she is doing better. doesn't have pain all the time in her knee. hurts just across the top of the knee.   -       Subjective Pain    Able to rate subjective pain?  yes  -    Pre-Treatment Pain Level  1  -       Posture/Observations    Posture/Observations Comments  non antalgic gait with no hyperextension  -      User Key  (r) = Recorded By, (t) = Taken By, (c) = Cosigned By    Initials Name Provider Type     Karen Han PTA Physical Therapy Assistant                      PT Assessment/Plan     Row Name 21          PT Assessment    Assessment Comments  patient demonstrates improved awareness of hyperextension control today and is able to complete all standing exercises with no incidences of hyperextension. no pain with steps today. able to sit to/frm stand with no UE assistance today.   -        PT Plan    PT Frequency  2x/week  -     Predicted Duration of Therapy Intervention (PT)  8-10 visits  -     PT Plan Comments  next visit ecc step downs if tolerates  -       User Key  (r) = Recorded By, (t) = Taken By, (c) = Cosigned By    Initials Name Provider Type     Karen Han PTA Physical Therapy Assistant            OP Exercises     Row Name 21             Subjective Comments    Subjective " Comments  states that she is doing better. doesn't have pain all the time in her knee. hurts just across the top of the knee.   -         Subjective Pain    Able to rate subjective pain?  yes  -      Pre-Treatment Pain Level  1  -      Post-Treatment Pain Level  0  -         Exercise 1    Exercise Name 1  Pro II LE's for ROM, endurance, strength  -      Time 1  10 minutes  -      Additional Comments  L 5.0  -         Exercise 2    Exercise Name 2  B St. HS S  -MH      Reps 2  2  -MH      Time 2  30 sec hold  -         Exercise 3    Exercise Name 3  B St. ITB S  -MH      Reps 3  2  -MH      Time 3  30 sec hold  -         Exercise 4    Exercise Name 4  B St. Quad S  -      Reps 4  2  -MH      Time 4  30 sec hold  -         Exercise 5    Exercise Name 5  Step Up Fwd B  -      Reps 5  20  -MH      Additional Comments  6 inch step  -         Exercise 6    Exercise Name 6  Step Up Lat B   -      Cueing 6  Verbal;Demo  -      Reps 6  20  -MH      Additional Comments  6 inch step  -         Exercise 7    Exercise Name 7  Sit to/from stand  -      Cueing 7  Verbal  -      Reps 7  20  -MH      Additional Comments  no UE  -         Exercise 8    Exercise Name 8  Standing Pivot  -      Reps 8  20  -MH      Additional Comments  moving feet and not locking knees  -         Exercise 9    Exercise Name 9  SLR Fwd Flex with ER  -      Cueing 9  Verbal;Tactile  -      Reps 9  20  -      Time 9  5 sec hold  -         Exercise 10    Exercise Name 10  Isometric Hamsets  -      Reps 10  20  -      Time 10  5 sec hold  -        User Key  (r) = Recorded By, (t) = Taken By, (c) = Cosigned By    Initials Name Provider Type    Karen Lozano, PTA Physical Therapy Assistant                       PT OP Goals     Row Name 09/07/21 0800          PT Short Term Goals    STG 1  Patient I with HEP and have additions/changes by next recertification  -     STG 1 Progress  Met  -     STG 2   "Patient able to perform sit to/from stand with 1 UE A = WB B LE x20, no increase in pain.  -     STG 2 Progress  Met  -     STG 3  Patient able to perform 20 SLR with ER with no lag present.  -     STG 3 Progress  Progressing  -     STG 4  Patient able to ascend/descend 3 steps reciprocally with 1 hand rail assist, no increase in pain x10 reps.  -     STG 4 Progress  Progressing  -        Long Term Goals    LTG 1  Patient to ahve improved B quad flexibility with heels <= 2\" from buttoccks B in prone.  -     LTG 1 Progress  Progressing  -     LTG 2  Patient able to ambulate 1/4 mile with no increase in pain  -     LTG 3  patient able to demonstrate proper standing and pivoting without locking knees and moving feet for work simulation.  -     LTG 3 Progress  Met  -     LTG 4  Patient able ot make a full composite squat and return to standing x5 with no increase in pain.  -     LTG 5  Patient able to perform a MS x20 reps with no valgus collapse.  -     LTG 6  I with final HEP.  -        Time Calculation    PT Goal Re-Cert Due Date  09/23/21  -       User Key  (r) = Recorded By, (t) = Taken By, (c) = Cosigned By    Initials Name Provider Type     Karen Han PTA Physical Therapy Assistant          Therapy Education  Given: HEP  Program: Reinforced  How Provided: Verbal, Demonstration  Provided to: Patient  Level of Understanding: Verbalized, Demonstrated              Time Calculation:   Start Time: 0830  Stop Time: 0914  Time Calculation (min): 44 min  Total Timed Code Minutes- PT: 44 minute(s)  Therapy Charges for Today     Code Description Service Date Service Provider Modifiers Qty    81519182752 HC PT THER PROC EA 15 MIN 9/7/2021 Karen Han PTA GP 3    01178935028 HC PT THER SUPP EA 15 MIN 9/7/2021 Karen Han PTA GP 1                    Karen Han PTA  9/7/2021     "

## 2021-09-09 ENCOUNTER — HOSPITAL ENCOUNTER (OUTPATIENT)
Dept: PHYSICAL THERAPY | Facility: HOSPITAL | Age: 26
Setting detail: THERAPIES SERIES
Discharge: HOME | End: 2021-09-09

## 2021-09-09 DIAGNOSIS — M25.562 LEFT ANTERIOR KNEE PAIN: Primary | ICD-10-CM

## 2021-09-09 PROCEDURE — 97110 THERAPEUTIC EXERCISES: CPT

## 2021-09-09 NOTE — THERAPY TREATMENT NOTE
Outpatient Physical Therapy Ortho Treatment Note  [facility]     Patient Name: Christ Sanchez  : 1995  MRN: [id number]  Today's Date: 2021      Visit Date: 2021     Attendance:   Does not rate % Improvement  MD Visit: TBD  Recheck Date: 2021    Therapy Diagnosis: L Anterior Knee Pain/PFS    Visit Dx:    ICD-10-CM ICD-9-CM   1. Left anterior knee pain  M25.562 719.46       There is no problem list on file for this patient.       No past medical history on file.     Past Surgical History:   Procedure Laterality Date   •  SECTION      x2       PT Ortho     Row Name 21       Subjective Comments    Subjective Comments  states that she is fine other than a headache today.   -       Subjective Pain    Able to rate subjective pain?  yes  -    Pre-Treatment Pain Level  0  -    Post-Treatment Pain Level  0  -       Posture/Observations    Posture/Observations Comments  non antalgic gait, no acute distress.   -      User Key  (r) = Recorded By, (t) = Taken By, (c) = Cosigned By    Initials Name Provider Type     Karen Han, FRANDY Physical Therapy Assistant                      PT Assessment/Plan     Row Name 21          PT Assessment    Assessment Comments  patient has no difficulty with eccentric step downs this date bilaterally. patient attempts full composite squat but has pain with completing. able to complete mini squats with no valgus collapse when completing and has no complaints of joint pain with completion.   -        PT Plan    PT Frequency  2x/week  -     PT Plan Comments  next visit shuttle 2L and 1L  -       User Key  (r) = Recorded By, (t) = Taken By, (c) = Cosigned By    Initials Name Provider Type     Karen Han, FRANDY Physical Therapy Assistant            OP Exercises     Row Name 21             Subjective Comments    Subjective Comments  states that she is fine other than a headache today.   -          Subjective Pain    Able to rate subjective pain?  yes  -      Pre-Treatment Pain Level  0  -      Post-Treatment Pain Level  0  -         Exercise 1    Exercise Name 1  Pro II LE's for ROM, endurance, strength  -      Time 1  10 minutes  -      Additional Comments  L 7.0  -         Exercise 2    Exercise Name 2  B St. HS S  -      Reps 2  2  -      Time 2  30 sec hold  -         Exercise 3    Exercise Name 3  B St. ITB S  -      Reps 3  2  -      Time 3  30 sec hold  -         Exercise 4    Exercise Name 4  Step Up Fwd B  -      Reps 4  20  -      Additional Comments  6 inch  -         Exercise 5    Exercise Name 5  Step Up Lat B  -      Reps 5  20  -MH      Additional Comments  6 inch  -         Exercise 6    Exercise Name 6  Ecc Step Down B  -      Reps 6  20  -      Additional Comments  6 inch  -         Exercise 7    Exercise Name 7  Full Squat  -      Reps 7  1  -MH      Additional Comments  painful across L knee  -         Exercise 8    Exercise Name 8  Mini Squats  -      Reps 8  20  -MH      Additional Comments  no valgus collapse  -         Exercise 9    Exercise Name 9  Track Walk   -      Reps 9  4 laps  -        User Key  (r) = Recorded By, (t) = Taken By, (c) = Cosigned By    Initials Name Provider Type    Karen Lozano, PTA Physical Therapy Assistant                       PT OP Goals     Row Name 09/09/21 0700          PT Short Term Goals    STG 1  Patient I with HEP and have additions/changes by next recertification  -     STG 1 Progress  Met  [facility]     STG 2  Patient able to perform sit to/from stand with 1 UE A = WB B LE x20, no increase in pain.  -     STG 2 Progress  Met  -     STG 3  Patient able to perform 20 SLR with ER with no lag present.  -     STG 3 Progress  Progressing  -     STG 4  Patient able to ascend/descend 3 steps reciprocally with 1 hand rail assist, no increase in pain x10 reps.  -     STG 4 Progress  Progressing   "-        Long Term Goals    LTG 1  Patient to ahve improved B quad flexibility with heels <= 2\" from buttoccks B in prone.  -     LTG 1 Progress  Progressing  -     LTG 2  Patient able to ambulate 1/4 mile with no increase in pain  -     LTG 3  patient able to demonstrate proper standing and pivoting without locking knees and moving feet for work simulation.  -     LTG 3 Progress  Met  -     LTG 4  Patient able ot make a full composite squat and return to standing x5 with no increase in pain.  [facility]  [facility] able to perform a MS x20 reps with no valgus collapse.  -     LTG 5 Progress  Met  -     LTG 6  I with final HEP.  -        Time Calculation    PT Goal Re-Cert Due Date  09/23/21  -       User Key  (r) = Recorded By, (t) = Taken By, (c) = Cosigned By    Initials Name Provider Type     Karen Han PTA Physical Therapy Assistant          Therapy Education  Given: HEP  Program: Reinforced  How Provided: Verbal, Demonstration  Provided to: Patient  Level of Understanding: Verbalized, Demonstrated              Time Calculation:   Start Time: 0743  Stop Time: 0825  Time Calculation (min): 42 min  Total Timed Code Minutes- PT: 42 minute(s)  Therapy Charges for Today     Code Description Service Date Service Provider Modifiers Qty    57501645461 HC PT THER PROC EA 15 MIN 9/9/2021 Karen Han PTA GP 3    87571668705 HC PT THER SUPP EA 15 MIN 9/9/2021 Karen Han PTA GP 1                    Karen Han PTA  9/9/2021     "

## 2021-09-13 ENCOUNTER — HOSPITAL ENCOUNTER (OUTPATIENT)
Dept: PHYSICAL THERAPY | Facility: HOSPITAL | Age: 26
Setting detail: THERAPIES SERIES
Discharge: HOME | End: 2021-09-13

## 2021-09-13 DIAGNOSIS — M25.562 LEFT ANTERIOR KNEE PAIN: Primary | ICD-10-CM

## 2021-09-13 PROCEDURE — 97110 THERAPEUTIC EXERCISES: CPT

## 2021-09-13 NOTE — THERAPY TREATMENT NOTE
Outpatient Physical Therapy Ortho Treatment Note  [facility]     Patient Name: Christ Sanchez  : 1995  MRN: [id number]  Today's Date: 2021      Visit Date: 2021     Attendance:   75% Improvement  MD Visit: TBD  Recheck Date: 2021    Therapy Diagnosis: L Anterior Knee Pain/PFS      Visit Dx:    ICD-10-CM ICD-9-CM   1. Left anterior knee pain  M25.562 719.46       There is no problem list on file for this patient.       No past medical history on file.     Past Surgical History:   Procedure Laterality Date   •  SECTION      x2       PT Ortho     Row Name 21 1100       Subjective Comments    Subjective Comments  states that feels like she has learned a lot in therapy to prevent injury. pain is not happening all the time. only when she knows she is making a wrong move such as pivoting on a fixed knee. she is more mindful to prevent injury now and tends to wear shoes that are more supportive to provide plenty of support for feet and knees. does feel like she is improved at this time.   -       Subjective Pain    Able to rate subjective pain?  yes  -    Pre-Treatment Pain Level  0  -       Posture/Observations    Posture/Observations Comments  non antalgic, no acute distress  -       Lower Extremity Flexibility    Quadriceps  Bilateral:;WNL Heel 2 inches or less from buttocks   -      User Key  (r) = Recorded By, (t) = Taken By, (c) = Cosigned By    Initials Name Provider Type     Karen Han PTA Physical Therapy Assistant                      PT Assessment/Plan     Row Name 21 1100          PT Assessment    Assessment Comments  patient verbalzing good understanding of appropriate shoe wear. verbalizes good udnerstanding of injury preventon for her knee with proper mechanics of her knee. patient has improved flexibility of bilateral quads. able to complete SLR fwd flexion with no lag present. able to ambulate 1/4 mile with no difficulty today.  patient gives excellent effort and appears to be compliant with current HEP.   -        PT Plan    PT Frequency  2x/week  -     PT Plan Comments  next visit assess reciprocal stairs, continue to build strength  -       User Key  (r) = Recorded By, (t) = Taken By, (c) = Cosigned By    Initials Name Provider Type    Karen Lozano PTA Physical Therapy Assistant            OP Exercises     Row Name 09/13/21 1100             Subjective Comments    Subjective Comments  states that feels like she has learned a lot in therapy to prevent injury. pain is not happening all the time. only when she knows she is making a wrong move such as pivoting on a fixed knee. she is more mindful to prevent injury now and tends to wear shoes that are more supportive to provide plenty of support for feet and knees. does feel like she is improved at this time.   -         Subjective Pain    Able to rate subjective pain?  yes  -      Pre-Treatment Pain Level  0  -      Post-Treatment Pain Level  0  -         Exercise 1    Exercise Name 1  Pro II LE's for ROM, endurance, strength  -      Time 1  10 minutes  -      Additional Comments  L 7.0  -         Exercise 2    Exercise Name 2  B St. HS S  -MH      Reps 2  2  -MH      Time 2  30 sec hold  -         Exercise 3    Exercise Name 3  B St. ITB S  -      Reps 3  2  -MH      Time 3  30 sec hold  -         Exercise 4    Exercise Name 4  B St. Quad S  -      Reps 4  2  -MH      Time 4  30 sec hold  -MH         Exercise 5    Exercise Name 5  SLR Fwd Flexion  -      Reps 5  20  -MH      Time 5  5 sec hold  -         Exercise 6    Exercise Name 6  Track Walk   -      Reps 6  1/4 mile  -         Exercise 7    Exercise Name 7  Shuttle 2L   -      Reps 7  20  -MH      Additional Comments  8 cords  -        User Key  (r) = Recorded By, (t) = Taken By, (c) = Cosigned By    Initials Name Provider Type    Karen Lozano PTA Physical Therapy Assistant     "                   PT OP Goals     Row Name 09/13/21 1100          PT Short Term Goals    STG 1  Patient I with HEP and have additions/changes by next recertification  -     STG 1 Progress  Met  -     STG 2  Patient able to perform sit to/from stand with 1 UE A = WB B LE x20, no increase in pain.  -     STG 2 Progress  Met  [facility]     STG 3  Patient able to perform 20 SLR with ER with no lag present.  -     STG 3 Progress  Met  [facility]     STG 4  Patient able to ascend/descend 3 steps reciprocally with 1 hand rail assist, no increase in pain x10 reps.  -     STG 4 Progress  Progressing  -        Long Term Goals    LTG 1  Patient to ahve improved B quad flexibility with heels <= 2\" from buttoccks B in prone.  -     LTG 1 Progress  Met  [facility]     LTG 2  Patient able to ambulate 1/4 mile with no increase in pain  -     LTG 2 Progress  Met  [facility]     LTG 3  patient able to demonstrate proper standing and pivoting without locking knees and moving feet for work simulation.  -     LTG 3 Progress  Met  [facility]     LTG 4  Patient able ot make a full composite squat and return to standing x5 with no increase in pain.  -     LTG 5  Patient able to perform a MS x20 reps with no valgus collapse.  -     LTG 5 Progress  Met  [facility]     LTG 6  I with final HEP.  -        Time Calculation    PT Goal Re-Cert Due Date  09/23/21  -       User Key  (r) = Recorded By, (t) = Taken By, (c) = Cosigned By    Initials Name Provider Type     Karen Han, PTA Physical Therapy Assistant          Therapy Education  Given: HEP, Symptoms/condition management, Pain management  Program: Reinforced  How Provided: Verbal, Demonstration  Provided to: Patient  Level of Understanding: Teach back education performed, Verbalized, Demonstrated              Time Calculation:   Start Time: 1130  Stop Time: 1214  Time Calculation (min): 44 min  Total Timed Code Minutes- PT: 44 minute(s)  Therapy Charges for Today     Code Description Service Date " Service Provider Modifiers Qty    95939100357 HC PT THER PROC EA 15 MIN 9/13/2021 Karen Han PTA GP 3    36403460380 HC PT THER SUPP EA 15 MIN 9/13/2021 Karen Han PTA GP 1                    Karen Han PTA  9/13/2021       This document has been electronically signed by Karen Han PTA on September 13, 2021 13:06 CDT

## 2021-09-15 ENCOUNTER — HOSPITAL ENCOUNTER (OUTPATIENT)
Dept: PHYSICAL THERAPY | Facility: HOSPITAL | Age: 26
Setting detail: THERAPIES SERIES
Discharge: HOME | End: 2021-09-15

## 2021-09-15 ENCOUNTER — TELEPHONE (OUTPATIENT)
Dept: PHYSICAL THERAPY | Facility: HOSPITAL | Age: 26
End: 2021-09-15

## 2021-09-15 DIAGNOSIS — M25.562 LEFT ANTERIOR KNEE PAIN: Primary | ICD-10-CM

## 2021-09-15 PROCEDURE — 97110 THERAPEUTIC EXERCISES: CPT | Performed by: PHYSICAL THERAPIST

## 2021-09-15 NOTE — THERAPY TREATMENT NOTE
Outpatient Physical Therapy Ortho Treatment Note  [facility]     Patient Name: Christ Sanchez  : 1995  MRN: [id number]  Today's Date: 9/15/2021      Visit Date: 09/15/2021    Attendance:   75% Improvement  MD Visit: TBD  Recheck Date: 2021     Therapy Diagnosis: L Anterior Knee Pain/PFS      Visit Dx:    ICD-10-CM ICD-9-CM   1. Left anterior knee pain  M25.562 719.46            No past medical history on file.     Past Surgical History:   Procedure Laterality Date   •  SECTION      x2       PT Ortho     Row Name 09/15/21 1100       Subjective Comments    Subjective Comments  Patient reports she got her times mixed up and apologizes. Reports no pain today.  -DUDLEY       Subjective Pain    Able to rate subjective pain?  yes  -DUDLEY    Pre-Treatment Pain Level  0  -AJ    Post-Treatment Pain Level  0  -AJ       Posture/Observations    Posture/Observations Comments  non antalgic gait, no acute distress  -      User Key  (r) = Recorded By, (t) = Taken By, (c) = Cosigned By    Initials Name Provider Type    Edwina Smart, PT DPT Physical Therapist                      PT Assessment/Plan     Row Name 09/15/21 1100          PT Assessment    Assessment Comments  Good performance of new ther ex today. Met STG for stairs. Still some fatigue with quad endurance exercises.  -        PT Plan    PT Frequency  2x/week  -     PT Plan Comments  Add CC resisted walk next session.  -       User Key  (r) = Recorded By, (t) = Taken By, (c) = Cosigned By    Initials Name Provider Type    Edwina Smart, PT DPT Physical Therapist            OP Exercises     Row Name 09/15/21 1100             Subjective Comments    Subjective Comments  Patient reports she got her times mixed up and apologizes. Reports no pain today.  -DUDLEY         Subjective Pain    Able to rate subjective pain?  yes  -DUDLEY      Pre-Treatment Pain Level  0  -AJ      Post-Treatment Pain Level  0  -AJ         Exercise 1     "Exercise Name 1  Pro II LE's for ROM, endurance, strength  -      Time 1  10 minutes  -      Additional Comments  L 7.0  -         Exercise 2    Exercise Name 2  B St. HS S  -AJ      Reps 2  2  -AJ      Time 2  30 sec hold  -AJ         Exercise 3    Exercise Name 3  B St. ITB S  -AJ      Reps 3  2  -AJ      Time 3  30 sec hold  -AJ         Exercise 4    Exercise Name 4  B St. Quad S  -AJ      Reps 4  2  -AJ      Time 4  30 sec hold  -AJ         Exercise 5    Exercise Name 5  Shuttle: 2L press  -      Additional Comments  8 cords  -AJ         Exercise 6    Exercise Name 6  Airex beam F/L, both with MS  -AJ      Reps 6  5 laps each  -         Exercise 7    Exercise Name 7  3 reciprocal steps  -      Reps 7  10  -      Additional Comments  1 HRA  -        User Key  (r) = Recorded By, (t) = Taken By, (c) = Cosigned By    Initials Name Provider Type    Edwina Smart, PT DPT Physical Therapist                       PT OP Goals     Row Name 09/15/21 1100          PT Short Term Goals    STG 1  Patient I with HEP and have additions/changes by next recertification  -     STG 1 Progress  Met  -     STG 2  Patient able to perform sit to/from stand with 1 UE A = WB B LE x20, no increase in pain.  -     STG 2 Progress  Met  -     STG 3  Patient able to perform 20 SLR with ER with no lag present.  -     STG 3 Progress  Met  -     STG 4  Patient able to ascend/descend 3 steps reciprocally with 1 hand rail assist, no increase in pain x10 reps.  -     STG 4 Progress  Met  -        Long Term Goals    LTG 1  Patient to ahve improved B quad flexibility with heels <= 2\" from buttoccks B in prone.  -     LTG 1 Progress  Met  -     LTG 2  Patient able to ambulate 1/4 mile with no increase in pain  -     LTG 2 Progress  Met  -     LTG 3  patient able to demonstrate proper standing and pivoting without locking knees and moving feet for work simulation.  -     LTG 3 Progress  Met  -AJ     " LTG 4  Patient able ot make a full composite squat and return to standing x5 with no increase in pain.  -     LTG 5  Patient able to perform a MS x20 reps with no valgus collapse.  -     LTG 5 Progress  Met  -     LTG 6  I with final HEP.  -        Time Calculation    PT Goal Re-Cert Due Date  09/23/21  -       User Key  (r) = Recorded By, (t) = Taken By, (c) = Cosigned By    Initials Name Provider Type     Edwina Brantley, PT DPT Physical Therapist                         Time Calculation:   Start Time: 1136  Stop Time: 1216  Time Calculation (min): 40 min  Therapy Charges for Today     Code Description Service Date Service Provider Modifiers Qty    15247730455 HC PT THER PROC EA 15 MIN 9/15/2021 Edwina Brantley, PT DPT GP 3                  This document has been electronically signed by Edwina Brantley PT DPT, [facility] on September 15, 2021 13:37 CDT

## 2021-09-20 ENCOUNTER — HOSPITAL ENCOUNTER (OUTPATIENT)
Dept: PHYSICAL THERAPY | Facility: HOSPITAL | Age: 26
Setting detail: THERAPIES SERIES
Discharge: HOME | End: 2021-09-20

## 2021-09-20 DIAGNOSIS — M25.562 LEFT ANTERIOR KNEE PAIN: Primary | ICD-10-CM

## 2021-09-20 PROCEDURE — 97110 THERAPEUTIC EXERCISES: CPT

## 2021-09-20 NOTE — THERAPY TREATMENT NOTE
Outpatient Physical Therapy Ortho Treatment Note  [facility]     Patient Name: Christ Sanchez  : 1995  MRN: [id number]  Today's Date: 2021      Visit Date: 2021     Attendance:   75% Improvement  MD Visit: TBD  Recheck Date: 2021    Therapy Diagnosis: L Anterior Knee Pain/PFS      Visit Dx:    ICD-10-CM ICD-9-CM   1. Left anterior knee pain  M25.562 719.46       There is no problem list on file for this patient.       No past medical history on file.     Past Surgical History:   Procedure Laterality Date   •  SECTION      x2       PT Ortho     Row Name 21 1100       Subjective Pain    Able to rate subjective pain?  yes  -    Pre-Treatment Pain Level  0  -    Post-Treatment Pain Level  0  -       Posture/Observations    Posture/Observations Comments  non antalgic gait  -      User Key  (r) = Recorded By, (t) = Taken By, (c) = Cosigned By    Initials Name Provider Type     Karen Han PTA Physical Therapy Assistant                      PT Assessment/Plan     Row Name 21 1100          PT Assessment    Assessment Comments  patient is able to complete reciproccal stairs with no UE and no difficulty. able to complete full composite squat with no increase in knee pain 5x. is nearing completion of all goals at this time. good tolerance throughout.   -        PT Plan    PT Frequency  2x/week  -     PT Plan Comments  recheck and discharge at next visit  -       User Key  (r) = Recorded By, (t) = Taken By, (c) = Cosigned By    Initials Name Provider Type     Karen Han PTA Physical Therapy Assistant            OP Exercises     Row Name 21 1100             Subjective Comments    Subjective Comments  went [place] the zoo in [place] and walked all around, walked at [employer] as well and then worked all day yesterday. had some knee pain with it. today i'm ok, its ok.   -         Subjective Pain    Able to rate subjective pain?  yes  -   "    Pre-Treatment Pain Level  0  -      Post-Treatment Pain Level  0  -         Exercise 1    Exercise Name 1  Pro II LE's for ROM, endurance, strength  -      Time 1  10 minutes  -      Additional Comments  L 7.0  -         Exercise 2    Exercise Name 2  B St. HS S  -MH      Reps 2  2  -MH      Time 2  30 sec hold  -         Exercise 3    Exercise Name 3  B St. ITB S  -MH      Reps 3  2  -MH      Time 3  30 sec hold  -         Exercise 4    Exercise Name 4  B St. Quad S  -      Reps 4  2  -MH      Time 4  30 sec hold  -         Exercise 5    Exercise Name 5  Reciprocal Stairs  -      Reps 5  10  -      Additional Comments  no UE  -         Exercise 6    Exercise Name 6  Shuttle 1L B  -      Time 6  4 minutes each  -      Additional Comments  8 cords  -         Exercise 7    Exercise Name 7  Full composite squat  -      Reps 7  5  -         Exercise 8    Exercise Name 8  Wall Sits  -      Reps 8  20  -      Time 8  5 sec hold  -        User Key  (r) = Recorded By, (t) = Taken By, (c) = Cosigned By    Initials Name Provider Type    Karen Lozano, PTA Physical Therapy Assistant                       PT OP Goals     Row Name 09/20/21 1100          PT Short Term Goals    STG 1  Patient I with HEP and have additions/changes by next recertification  -     STG 1 Progress  Met  [facility]     STG 2  Patient able to perform sit to/from stand with 1 UE A = WB B LE x20, no increase in pain.  -     STG 2 Progress  Met  [facility]     STG 3  Patient able to perform 20 SLR with ER with no lag present.  -     STG 3 Progress  Met  [facility]     STG 4  Patient able to ascend/descend 3 steps reciprocally with 1 hand rail assist, no increase in pain x10 reps.  -     STG 4 Progress  Met  [facility]        Long Term Goals    LTG 1  Patient to ahve improved B quad flexibility with heels <= 2\" from buttoccks B in prone.  -     LTG 1 Progress  Met  [facility]     LTG 2  Patient able to ambulate 1/4 mile with no increase " in pain  -     LTG 2 Progress  Met  [facility]     LTG 3  patient able to demonstrate proper standing and pivoting without locking knees and moving feet for work simulation.  -     LTG 3 Progress  Met  [facility]     LTG 4  Patient able ot make a full composite squat and return to standing x5 with no increase in pain.  -     [facility]  [facility] able to perform a MS x20 reps with no valgus collapse.  -     LTG 5 Progress  Met  [facility]     LTG 6  I with final HEP.  -     LTG 6 Progress  Partially Met  -        Time Calculation    PT Goal Re-Cert Due Date  09/23/21  -       User Key  (r) = Recorded By, (t) = Taken By, (c) = Cosigned By    Initials Name Provider Type     Karen Han PTA Physical Therapy Assistant          Therapy Education  Given: HEP, Symptoms/condition management, Pain management  Program: Reinforced  How Provided: Verbal, Demonstration  Provided to: Patient  Level of Understanding: Teach back education performed, Verbalized, Demonstrated              Time Calculation:   Start Time: 1130  Stop Time: 1211  Time Calculation (min): 41 min  Total Timed Code Minutes- PT: 41 minute(s)  Therapy Charges for Today     Code Description Service Date Service Provider Modifiers Qty    82158206843 HC PT THER SUPP EA 15 MIN 9/20/2021 Karen Han PTA GP 1    [id number] HC PT THER PROC EA 15 MIN 9/20/2021 Karen Han PTA GP 3                    Karen Han PTA  9/20/2021       This document has been electronically signed by Karen Han PTA on September 20, 2021 12:13 CDT

## 2021-09-22 ENCOUNTER — HOSPITAL ENCOUNTER (OUTPATIENT)
Dept: PHYSICAL THERAPY | Facility: HOSPITAL | Age: 26
Setting detail: THERAPIES SERIES
Discharge: HOME | End: 2021-09-22

## 2021-09-22 DIAGNOSIS — M25.562 LEFT ANTERIOR KNEE PAIN: Primary | ICD-10-CM

## 2021-09-22 PROCEDURE — 97110 THERAPEUTIC EXERCISES: CPT | Performed by: PHYSICAL THERAPIST

## 2021-09-22 PROCEDURE — 97530 THERAPEUTIC ACTIVITIES: CPT | Performed by: PHYSICAL THERAPIST

## 2021-09-22 NOTE — THERAPY DISCHARGE NOTE
Outpatient Physical Therapy Ortho Progress Note/Discharge Summary  [facility]     Patient Name: Christ Sanchez  : 1995  MRN: [id number]  Today's Date: 2021      Visit Date: 2021     Attendance:   80% Improvement  Next MD appt: JOAQUIN.  Recertification: N/A    Therapy Diagnosis: L knee anterior pain/PFS        Visit Dx:    ICD-10-CM ICD-9-CM   1. Left anterior knee pain  M25.562 719.46            No past medical history on file.     Past Surgical History:   Procedure Laterality Date   •  SECTION      x2       PT Ortho     Row Name 21 1100       Subjective Comments    Subjective Comments  Patient reports shehad a little pain this morning but just a little and now it is gone.  -AJ       Subjective Pain    Able to rate subjective pain?  yes  -AJ    Pre-Treatment Pain Level  0  -AJ       Posture/Observations    Alignment Options  Genu valgus  -AJ    Genu valgus  Bilateral:;Mild;Increased  -AJ    Posture/Observations Comments  No distress.  -AJ       Special Tests/Palpation    Special Tests/Palpation  -- No nuvia TTP.  -AJ       Patellar Accessory Motions    Superior glide  Right:;Left:;WNL  -AJ    Inferior glide  Right:;Left:;WNL  -AJ    Medial glide  Right:;Left:;WNL  -AJ    Lateral glide  Right:;Left:;WNL  -AJ    Lateral tilt  Right:;Left:;WNL  -AJ       General ROM    GENERAL ROM COMMENTS  AROM B knees 0°-135°  -AJ       MMT (Manual Muscle Testing)    General MMT Comments  B LE 5/5.  -AJ       Sensation    Sensation WNL?  WNL  -AJ    Light Touch  No apparent deficits  -AJ       Lower Extremity Flexibility    Quadriceps  Bilateral:;WNL  -AJ       Transfers    Comment (Transfers)  I with all transfers  -AJ       Gait/Stairs (Locomotion)    Comment (Gait/Stairs)  FWB, non-antalgic gait, no deviations.  -AJ      User Key  (r) = Recorded By, (t) = Taken By, (c) = Cosigned By    Initials Name Provider Type    Edwina Smart, PT DPT Physical Therapist             Barriers  "to Rehab: The chronicity of this issue, The patient's obesity.     Safety Issues: None noted.          PT Assessment/Plan     Row Name 09/22/21 1100          PT Assessment    Functional Limitations  Performance in leisure activities  -AJ     Impairments  Endurance  -AJ     Assessment Comments  Patient met all goals and is now I with final HEP.  -AJ        PT Plan    PT Frequency  -- N/A  -AJ     PT Plan Comments  D/C to an I HEP.  -AJ       User Key  (r) = Recorded By, (t) = Taken By, (c) = Cosigned By    Initials Name Provider Type    Edwina Smart, PT DPT Physical Therapist              OP Exercises     Row Name 09/22/21 1100             Subjective Comments    Subjective Comments  Patient reports shehad a little pain this morning but just a little and now it is gone.  -AJ         Subjective Pain    Able to rate subjective pain?  yes  -AJ      Pre-Treatment Pain Level  0  -AJ         Exercise 1    Exercise Name 1  Pro II LE's for ROM, endurance, strength  -AJ      Time 1  10 minutes  -AJ      Additional Comments  L 7.0  -AJ         Exercise 2    Exercise Name 2  B St. HS S  -AJ      Reps 2  2  -AJ      Time 2  30 sec hold  -AJ         Exercise 3    Exercise Name 3  B St. ITB S  -AJ      Reps 3  2  -AJ      Time 3  30 sec hold  -AJ         Exercise 4    Exercise Name 4  B St. Quad S  -AJ      Reps 4  2  -AJ      Time 4  30 sec hold  -AJ         Exercise 5    Exercise Name 5  Sit to/from stand  -AJ      Reps 5  20  -AJ      Additional Comments  no UE  -AJ         Exercise 6    Exercise Name 6  measurements  -AJ         Exercise 7    Exercise Name 7  B Step up F/L  -AJ      Reps 7  20 each  -AJ      Additional Comments  6\" step  -AJ         Exercise 8    Exercise Name 8  B Ecc step downs  -AJ      Reps 8  20 each  -AJ      Additional Comments  6\" step  -AJ         Exercise 9    Exercise Name 9  Wall sits  -AJ      Sets 9  20  -AJ        User Key  (r) = Recorded By, (t) = Taken By, (c) = Cosigned By    " "Initials Name Provider Type    Edwina Smart, PT DPT Physical Therapist                         PT OP Goals     Row Name 09/22/21 1100          PT Short Term Goals    STG 1  Patient I with HEP and have additions/changes by next recertification  -     STG 1 Progress  Met  -     STG 2  Patient able to perform sit to/from stand with 1 UE A = WB B LE x20, no increase in pain.  -     STG 2 Progress  Met  -     STG 3  Patient able to perform 20 SLR with ER with no lag present.  -     STG 3 Progress  Met  -     STG 4  Patient able to ascend/descend 3 steps reciprocally with 1 hand rail assist, no increase in pain x10 reps.  -     STG 4 Progress  Met  -        Long Term Goals    LTG 1  Patient to ahve improved B quad flexibility with heels <= 2\" from buttoccks B in prone.  -     LTG 1 Progress  Met  -AJ     LTG 2  Patient able to ambulate 1/4 mile with no increase in pain  -     LTG 2 Progress  Met  -     LTG 3  patient able to demonstrate proper standing and pivoting without locking knees and moving feet for work simulation.  -     LTG 3 Progress  Met  -     LTG 4  Patient able ot make a full composite squat and return to standing x5 with no increase in pain.  -     LTG 4 Progress  Met  -     LTG 5  Patient able to perform a MS x20 reps with no valgus collapse.  -     LTG 5 Progress  Met  -     LTG 6  I with final HEP.  -     LTG 6 Progress  Met  -        Time Calculation    PT Goal Re-Cert Due Date  -- N/A  -       User Key  (r) = Recorded By, (t) = Taken By, (c) = Cosigned By    Initials Name Provider Type    Edwina Smart, PT DPT Physical Therapist          Therapy Education  Given: HEP, Symptoms/condition management, Pain management  Program: Reinforced  How Provided: Verbal  Provided to: Patient  Level of Understanding: Teach back education performed, Verbalized, Demonstrated    Outcome Measure Options: Lower Extremity Functional Scale (LEFS)  Lower " Extremity Functional Index  Any of your usual work, housework or school activities: A little bit of difficulty  Your usual hobbies, recreational or sporting activities: A little bit of difficulty  Getting into or out of the bath: No difficulty  Walking between rooms: No difficulty  Putting on your shoes or socks: No difficulty  Squatting: A little bit of difficulty  Lifting an object, like a bag of groceries from the floor: No difficulty  Performing light activities around your home: A little bit of difficulty  Performing heavy activities around your home: A little bit of difficulty  Getting into or out of a car: No difficulty  Walking 2 blocks: No difficulty  Walking a mile: A little bit of difficulty  Going up or down 10 stairs (about 1 flight of stairs): A little bit of difficulty  Standing for 1 hour: A little bit of difficulty  Sitting for 1 hour: No difficulty  Running on even ground: A little bit of difficulty  Running on uneven ground: A little bit of difficulty  Making sharp turns while running fast: A little bit of difficulty  Hopping: A little bit of difficulty  Rolling over in bed: No difficulty  Total: 68      Time Calculation:   Start Time: 1136  Stop Time: 1215  Time Calculation (min): 39 min  Total Timed Code Minutes- PT: 39 minute(s)  Therapy Charges for Today     Code Description Service Date Service Provider Modifiers Qty    07266179854 HC PT THER PROC EA 15 MIN 9/22/2021 Edwina Brantley, PT DPT GP 2    60366444667 HC PT THERAPEUTIC ACT EA 15 MIN 9/22/2021 Edwina Brantley, PT DPT GP 1          PT G-Codes  Outcome Measure Options: Lower Extremity Functional Scale (LEFS)  Total: 68     OP PT Discharge Summary  Date of Discharge: 09/22/21  Reason for Discharge: All goals achieved, Independent  Outcomes Achieved: Able to achieve all goals within established timeline  Discharge Destination: Home with home program      This document has been electronically signed by Edwina Brantley PT  GREYT, CSCS on September 22, 2021 12:16 CDT

## 2021-09-27 ENCOUNTER — APPOINTMENT (OUTPATIENT)
Dept: PHYSICAL THERAPY | Facility: HOSPITAL | Age: 26
End: 2021-09-27

## 2021-09-29 ENCOUNTER — APPOINTMENT (OUTPATIENT)
Dept: PHYSICAL THERAPY | Facility: HOSPITAL | Age: 26
End: 2021-09-29

## 2022-01-28 ENCOUNTER — LAB (OUTPATIENT)
Dept: LAB | Facility: HOSPITAL | Age: 27
End: 2022-01-28

## 2022-01-28 ENCOUNTER — OFFICE VISIT (OUTPATIENT)
Dept: FAMILY MEDICINE CLINIC | Facility: CLINIC | Age: 27
End: 2022-01-28

## 2022-01-28 VITALS
OXYGEN SATURATION: 98 % | TEMPERATURE: 97.3 F | WEIGHT: 218 LBS | HEIGHT: 64 IN | SYSTOLIC BLOOD PRESSURE: 130 MMHG | HEART RATE: 86 BPM | BODY MASS INDEX: 37.22 KG/M2 | DIASTOLIC BLOOD PRESSURE: 90 MMHG

## 2022-01-28 DIAGNOSIS — Z13.1 DIABETES MELLITUS SCREENING: ICD-10-CM

## 2022-01-28 DIAGNOSIS — I10 ESSENTIAL HYPERTENSION: Primary | ICD-10-CM

## 2022-01-28 DIAGNOSIS — I10 ESSENTIAL HYPERTENSION: ICD-10-CM

## 2022-01-28 DIAGNOSIS — Z13.21 ENCOUNTER FOR VITAMIN DEFICIENCY SCREENING: ICD-10-CM

## 2022-01-28 PROCEDURE — 80053 COMPREHEN METABOLIC PANEL: CPT

## 2022-01-28 PROCEDURE — 85027 COMPLETE CBC AUTOMATED: CPT

## 2022-01-28 PROCEDURE — 99214 OFFICE O/P EST MOD 30 MIN: CPT | Performed by: NURSE PRACTITIONER

## 2022-01-28 PROCEDURE — 80061 LIPID PANEL: CPT

## 2022-01-28 PROCEDURE — 84443 ASSAY THYROID STIM HORMONE: CPT

## 2022-01-28 PROCEDURE — 82306 VITAMIN D 25 HYDROXY: CPT

## 2022-01-28 PROCEDURE — 83036 HEMOGLOBIN GLYCOSYLATED A1C: CPT

## 2022-01-28 RX ORDER — HYDROCHLOROTHIAZIDE 25 MG/1
25 TABLET ORAL DAILY
Qty: 30 TABLET | Refills: 0 | Status: SHIPPED | OUTPATIENT
Start: 2022-01-28 | End: 2022-03-25 | Stop reason: ALTCHOICE

## 2022-01-28 NOTE — PROGRESS NOTES
"Chief Complaint  Hypertension, Headache, and Blurred Vision    Subjective          Christ Arias presents to [facility]    PCP: Dr. Rangel    CC: \"blood pressure problems\"    Denies previously being treated for HTN.  Strong family history of the same.  Over the last week, has noted frequent headaches, blurred vision, dizziness and bp at home running 140-160's/.  Has not had screening labs in the last year.     Hypertension  This is a new problem. The current episode started in the past 7 days. The problem is unchanged. Associated symptoms include headaches ( when bp up), malaise/fatigue and peripheral edema (mild). Pertinent negatives include no anxiety, chest pain, orthopnea, palpitations, PND, shortness of breath or sweats. There are no associated agents to hypertension. Risk factors for coronary artery disease include obesity. Past treatments include nothing. Current antihypertension treatment includes nothing.       Review of Systems   Constitutional: Positive for malaise/fatigue.   Eyes: Positive for visual disturbance (blurred vision when bp elevated).   Respiratory: Negative.  Negative for shortness of breath.    Cardiovascular: Positive for leg swelling (mild). Negative for chest pain, palpitations, orthopnea and PND.   Gastrointestinal: Negative.    Genitourinary: Negative.    Musculoskeletal: Negative.    Skin: Negative.    Neurological: Positive for dizziness ( when bp up), light-headedness (when bp up) and headaches ( when bp up).        Objective   Vital Signs:   /90 (BP Location: Left arm, Patient Position: Sitting, Cuff Size: Large Adult)   Pulse 86   Temp 97.3 °F (36.3 °C)   Ht 162.6 cm (64\")   Wt 98.9 kg (218 lb)   SpO2 98%   BMI 37.42 kg/m²       Physical Exam  Vitals and nursing note reviewed.   Constitutional:       General: She is not in acute distress.     Appearance: She is obese. She " is not ill-appearing.   HENT:      Head: Normocephalic and atraumatic.      Right Ear: Tympanic membrane and ear canal normal.      Left Ear: Tympanic membrane and ear canal normal.      Mouth/Throat:      Mouth: Mucous membranes are moist.      Pharynx: Oropharynx is clear. No oropharyngeal exudate or posterior oropharyngeal erythema.   Eyes:      General:         Right eye: No discharge.         Left eye: No discharge.      Conjunctiva/sclera: Conjunctivae normal.   Cardiovascular:      Rate and Rhythm: Normal rate and regular rhythm.   Pulmonary:      Effort: Pulmonary effort is normal. No respiratory distress.      Breath sounds: Normal breath sounds. No wheezing, rhonchi or rales.   Musculoskeletal:      Cervical back: Neck supple. No tenderness.      Right lower leg: Edema (trace, non pitting ) present.      Left lower leg: Edema (trace, not pitting) present.   Lymphadenopathy:      Cervical: No cervical adenopathy.   Skin:     General: Skin is warm and dry.   Neurological:      General: No focal deficit present.      Mental Status: She is alert and oriented to person, place, and time.   Psychiatric:         Mood and Affect: Mood normal.         Thought Content: Thought content normal.          Result Review :                 Assessment and Plan    Diagnoses and all orders for this visit:    1. Essential hypertension (Primary)  -     CBC No Differential; Future  -     Comprehensive metabolic panel; Future  -     TSH; Future  -     Lipid panel; Future  -     hydroCHLOROthiazide (HYDRODIURIL) 25 MG tablet; Take 1 tablet by mouth Daily.  Dispense: 30 tablet; Refill: 0    2. Encounter for vitamin deficiency screening  -     Vitamin D 25 hydroxy; Future    3. Diabetes mellitus screening  -     Hemoglobin A1c; Future      Lab update today as she is fasting.  Will notify with results when available.      Agreeable to starting diuretic.  Rx for HCTZ 25 mg daily provided.  Might start with 12.5 mg daily for the first  few days and if bp improves, continue at this dose, if not increase to the full 25 mg.     Body mass index is 37.42 kg/m². Diet and exercise encouraged.      Plan recheck in 2-3 weeks for BP recheck.     Return to Same Day Clinic PRN  See PCP for routine f/u visit and management of chronic medical conditions      This document has been electronically signed by MARIE Armenta on January 28, 2022 10:23 CST,.

## 2022-01-29 LAB
25(OH)D3 SERPL-MCNC: 23.3 NG/ML (ref 30–100)
ALBUMIN SERPL-MCNC: 4.4 G/DL (ref 3.5–5.2)
ALBUMIN/GLOB SERPL: 1.4 G/DL
ALP SERPL-CCNC: 93 U/L (ref 39–117)
ALT SERPL W P-5'-P-CCNC: 10 U/L (ref 1–33)
ANION GAP SERPL CALCULATED.3IONS-SCNC: 11.7 MMOL/L (ref 5–15)
AST SERPL-CCNC: 16 U/L (ref 1–32)
BILIRUB SERPL-MCNC: 0.2 MG/DL (ref 0–1.2)
BUN SERPL-MCNC: 11 MG/DL (ref 6–20)
BUN/CREAT SERPL: 15.1 (ref 7–25)
CALCIUM SPEC-SCNC: 9.6 MG/DL (ref 8.6–10.5)
CHLORIDE SERPL-SCNC: 101 MMOL/L (ref 98–107)
CHOLEST SERPL-MCNC: 185 MG/DL (ref 0–200)
CO2 SERPL-SCNC: 24.3 MMOL/L (ref 22–29)
CREAT SERPL-MCNC: 0.73 MG/DL (ref 0.57–1)
DEPRECATED RDW RBC AUTO: 38 FL (ref 37–54)
ERYTHROCYTE [DISTWIDTH] IN BLOOD BY AUTOMATED COUNT: 13.4 % (ref 12.3–15.4)
GFR SERPL CREATININE-BSD FRML MDRD: 95 ML/MIN/1.73
GLOBULIN UR ELPH-MCNC: 3.2 GM/DL
GLUCOSE SERPL-MCNC: 83 MG/DL (ref 65–99)
HBA1C MFR BLD: 6.02 % (ref 4.8–5.6)
HCT VFR BLD AUTO: 37.7 % (ref 34–46.6)
HDLC SERPL-MCNC: 45 MG/DL (ref 40–60)
HGB BLD-MCNC: 12.4 G/DL (ref 12–15.9)
LDLC SERPL CALC-MCNC: 119 MG/DL (ref 0–100)
LDLC/HDLC SERPL: 2.6 {RATIO}
MCH RBC QN AUTO: 26.1 PG (ref 26.6–33)
MCHC RBC AUTO-ENTMCNC: 32.9 G/DL (ref 31.5–35.7)
MCV RBC AUTO: 79.2 FL (ref 79–97)
PLATELET # BLD AUTO: 418 10*3/MM3 (ref 140–450)
PMV BLD AUTO: 10.9 FL (ref 6–12)
POTASSIUM SERPL-SCNC: 4.3 MMOL/L (ref 3.5–5.2)
PROT SERPL-MCNC: 7.6 G/DL (ref 6–8.5)
RBC # BLD AUTO: 4.76 10*6/MM3 (ref 3.77–5.28)
SODIUM SERPL-SCNC: 137 MMOL/L (ref 136–145)
TRIGL SERPL-MCNC: 114 MG/DL (ref 0–150)
TSH SERPL DL<=0.05 MIU/L-ACNC: 1.86 UIU/ML (ref 0.27–4.2)
VLDLC SERPL-MCNC: 21 MG/DL (ref 5–40)
WBC NRBC COR # BLD: 8 10*3/MM3 (ref 3.4–10.8)

## 2022-01-31 DIAGNOSIS — E55.9 VITAMIN D INSUFFICIENCY: Primary | ICD-10-CM

## 2022-01-31 RX ORDER — OMEGA-3S/DHA/EPA/FISH OIL/D3 300MG-1000
400 CAPSULE ORAL DAILY
Qty: 30 TABLET | Refills: 5 | Status: SHIPPED | OUTPATIENT
Start: 2022-01-31

## 2022-02-24 ENCOUNTER — OFFICE VISIT (OUTPATIENT)
Dept: FAMILY MEDICINE CLINIC | Facility: CLINIC | Age: 27
End: 2022-02-24

## 2022-02-24 VITALS
HEIGHT: 64 IN | DIASTOLIC BLOOD PRESSURE: 88 MMHG | OXYGEN SATURATION: 99 % | TEMPERATURE: 98.3 F | WEIGHT: 218.1 LBS | SYSTOLIC BLOOD PRESSURE: 132 MMHG | HEART RATE: 88 BPM | BODY MASS INDEX: 37.24 KG/M2

## 2022-02-24 DIAGNOSIS — Z11.59 NEED FOR HEPATITIS C SCREENING TEST: ICD-10-CM

## 2022-02-24 DIAGNOSIS — I10 ESSENTIAL HYPERTENSION: ICD-10-CM

## 2022-02-24 DIAGNOSIS — Z91.89 AT RISK FOR OBSTRUCTIVE SLEEP APNEA: Primary | ICD-10-CM

## 2022-02-24 PROCEDURE — 99213 OFFICE O/P EST LOW 20 MIN: CPT | Performed by: STUDENT IN AN ORGANIZED HEALTH CARE EDUCATION/TRAINING PROGRAM

## 2022-02-24 RX ORDER — LOSARTAN POTASSIUM 25 MG/1
25 TABLET ORAL DAILY
Qty: 60 TABLET | Refills: 0 | Status: SHIPPED | OUTPATIENT
Start: 2022-02-24 | End: 2022-03-25 | Stop reason: SDUPTHER

## 2022-03-07 ENCOUNTER — PATIENT ROUNDING (BHMG ONLY) (OUTPATIENT)
Dept: SLEEP MEDICINE | Facility: HOSPITAL | Age: 27
End: 2022-03-07

## 2022-03-07 ENCOUNTER — OFFICE VISIT (OUTPATIENT)
Dept: SLEEP MEDICINE | Facility: HOSPITAL | Age: 27
End: 2022-03-07

## 2022-03-07 VITALS
HEIGHT: 64 IN | OXYGEN SATURATION: 98 % | BODY MASS INDEX: 37.22 KG/M2 | DIASTOLIC BLOOD PRESSURE: 95 MMHG | SYSTOLIC BLOOD PRESSURE: 143 MMHG | WEIGHT: 218 LBS | HEART RATE: 88 BPM

## 2022-03-07 DIAGNOSIS — G47.00 FREQUENT NOCTURNAL AWAKENING: ICD-10-CM

## 2022-03-07 DIAGNOSIS — R06.83 SNORING: Primary | ICD-10-CM

## 2022-03-07 DIAGNOSIS — E66.01 MORBID OBESITY: ICD-10-CM

## 2022-03-07 DIAGNOSIS — G47.19 EXCESSIVE DAYTIME SLEEPINESS: ICD-10-CM

## 2022-03-07 DIAGNOSIS — R06.81 WITNESSED EPISODE OF APNEA: ICD-10-CM

## 2022-03-07 PROCEDURE — 99214 OFFICE O/P EST MOD 30 MIN: CPT | Performed by: NURSE PRACTITIONER

## 2022-03-07 NOTE — PROGRESS NOTES
New Patient Sleep Medicine Consultation    Encounter Date: 3/7/2022         Patient's Primary Care Provider: Lawrence Rangel MD  Referring Provider: Lawrence Rangel MD  Reason for consultation/chief complaint: snoring, witnessed apneas, excessive daytime sleepiness and unrefreshing sleep    Christ Arias is a 26 y.o. female who admits to snoring, unrestful sleep, high blood pressure, decreased libido, excessive daytime sleepiness, morning headaches, irritability, sleeping less than 6 hours per night, disturbed or restless sleep, memory loss, up to bathroom at night, night sweats, restless legs at night and difficulty falling asleep.    She denies cataplexy, sleep paralysis, or hypnagogic hallucinations. Her bedtime is ~ 373573-12. She  falls asleep after 1-3 minutes, and is up 0600 times per night. She wakes up ~ 6. She endorses 0-1 hours of sleep. She drinks 0 cups of coffee, 0-1 teas, and 0 sodas per day. She drinks 0 alcoholic beverages per week. She is not a current smoker. She does not take sedatives or hypnotics. She has no sleepiness with driving. She very rarely naps.     She has been waking up in the middle of the night very hot and red-faced.    [place] - 5    Prior Sleep Testing: None    Past Medical History:   Diagnosis Date   • Hypertension      Social History     Socioeconomic History   • Marital status:    Tobacco Use   • Smoking status: Never Smoker   • Smokeless tobacco: Never Used   Vaping Use   • Vaping Use: Never used   Substance and Sexual Activity   • Alcohol use: Never   • Drug use: Never   • Sexual activity: Yes     Partners: Male     Birth control/protection: Surgical     Comment: bilateral tubal ligation- clamps     Family History   Problem Relation Age of Onset   • Hypertension Father    • Diabetes Father    • Hypertension Mother    • Diabetes Mother      Prior T&A, UPPP, maxillofacial, or bariatric surgery: None  Family history of sleep disorders: Mom, father, brother -  "NEETA on CPAP; also  and friend - NEETA on CPAP  Other family history + for: As above  Occupation:   Marital status:   Children: 2  Has 3 brothers and 0 sisters  Smoking history: smoked never  Moved here from [place] in November 2020      Review of Systems:  Constitutional: positive for fatigue  Eyes: negative  Ears, nose, mouth, throat, and face: positive for snoring  Respiratory: negative  Cardiovascular: positive for fatigue and palpitations  Gastrointestinal: negative  Genitourinary:negative  Integument/breast: negative  Hematologic/lymphatic: negative  Musculoskeletal:negative  Neurological: negative  Behavioral/Psych: positive for sexual difficulty and sleep disturbance  Endocrine: negative  Allergic/Immunologic: negative   Patient advised to discuss any positive ROS with PCP.      Vitals:    03/07/22 0838   BP: 143/95   Pulse: 88   SpO2: 98%           03/07/22  0838   Weight: 98.9 kg (218 lb)       Body mass index is 37.4 kg/m². Patient's Body mass index is 37.4 kg/m². indicating that she is morbidly obese (BMI > 40 or > 35 with obesity - related health condition). Obesity-related health conditions include the following: hypertension. Obesity is unchanged. BMI is is above average; BMI management plan is completed. We discussed portion control and increasing exercise.    Tobacco Use: Low Risk    • Smoking Tobacco Use: Never Smoker   • Smokeless Tobacco Use: Never Used     Physical Exam:        General: Alert. Cooperative. Well developed. No acute distress.   Head/Neck:  Normocephalic. Symmetrical. Atraumatic.     Neck circumference: 15\"             Eyes: Sclera clear. No icterus. PERRLA. Normal EOM.             Ears: No deformities. Normal hearing.             Nose: No septal deviation. No drainage.          Throat: No oral lesions. No thrush. Moist mucous membranes. Trachea midline    Tongue is normal     Dentition is good       Pharynx: Posterior pharyngeal pillars are " narrow    Mallampati score of III (soft and hard palate and base of uvula visible)    Pharynx is normal with unrermarkable tonsils   Chest Wall:  Normal shape. Symmetric expansion with respiration. No tenderness.          Lungs:  Clear to auscultation bilaterally. No wheezes. No rhonchi. No rales. Respirations regular, even and unlabored.            Heart:  Regular rhythm and normal rate. Normal S1 and S2. No murmur.     Abdomen:  Soft, non-tender and non-distended. Normal bowel sounds. No masses.  Extremities:  Moves all extremities well. No edema.           Pulses: Pulses palpable and equal bilaterally.               Skin: Dry. Intact. No bleeding. No rash.           Neuro: Moves all 4 extremities and cranial nerves grossly intact.  Psychiatric: Normal mood and affect.      Current Outpatient Medications:   •  cholecalciferol (VITAMIN D3) 10 MCG (400 UNIT) tablet, Take 1 tablet by mouth Daily., Disp: 30 tablet, Rfl: 5  •  losartan (Cozaar) 25 MG tablet, Take 1 tablet by mouth Daily., Disp: 60 tablet, Rfl: 0  •  hydroCHLOROthiazide (HYDRODIURIL) 25 MG tablet, Take 1 tablet by mouth Daily., Disp: 30 tablet, Rfl: 0    WBC   Date Value Ref Range Status   01/28/2022 8.00 3.40 - 10.80 10*3/mm3 Final     RBC   Date Value Ref Range Status   01/28/2022 4.76 3.77 - 5.28 10*6/mm3 Final     Hemoglobin   Date Value Ref Range Status   01/28/2022 12.4 12.0 - 15.9 g/dL Final     Hematocrit   Date Value Ref Range Status   01/28/2022 37.7 34.0 - 46.6 % Final     MCV   Date Value Ref Range Status   01/28/2022 79.2 79.0 - 97.0 fL Final     MCH   Date Value Ref Range Status   01/28/2022 26.1 (L) 26.6 - 33.0 pg Final     MCHC   Date Value Ref Range Status   01/28/2022 32.9 31.5 - 35.7 g/dL Final     RDW   Date Value Ref Range Status   01/28/2022 13.4 12.3 - 15.4 % Final     RDW-SD   Date Value Ref Range Status   01/28/2022 38.0 37.0 - 54.0 fl Final     MPV   Date Value Ref Range Status   01/28/2022 10.9 6.0 - 12.0 fL Final      Platelets   Date Value Ref Range Status   01/28/2022 418 140 - 450 10*3/mm3 Final     Lab Results   Component Value Date    GLUCOSE 83 01/28/2022    BUN 11 01/28/2022    CREATININE 0.73 01/28/2022    EGFRIFNONA 95 01/28/2022    BCR 15.1 01/28/2022    K 4.3 01/28/2022    CO2 24.3 01/28/2022    CALCIUM 9.6 01/28/2022    ALBUMIN 4.40 01/28/2022    AST 16 01/28/2022    ALT 10 01/28/2022       Contraindications to home sleep test: none    ASSESSMENT:  1. Excessive daytime sleepiness, presumed obstructive sleep apnea - New (to me), additional work-up planned (4)  1. Check home sleep study   2. Follow up for results  2. Snoring, presumed obstructive sleep apnea - New (to me), additional work-up planned (4)  1. As above  3. Frequent nocturnal awakenings - New (to me), additional work-up planned (4)  1. As above  4. Witnessed episode of apnea - New (to me), additional work-up planned (4)  1. As above  5. Morbid obesity - BMI 37.4 - stable chronic illness      I spent 30 minutes caring for Christ on this date of service. This time includes time spent by me in the following activities: preparing for the visit, reviewing tests, obtaining and/or reviewing a separately obtained history, performing a medically appropriate examination and/or evaluation , counseling and educating the patient/family/caregiver, ordering medications, tests, or procedures, documenting information in the medical record and care coordination    RTC 2 weeks after testing. Patient agrees to return sooner if changes in symptoms.           This document has been electronically signed by MARIE Mejia on March 7, 2022 08:44 CST          CC: Lawrence Rangel MD Moody, Leo James, MD

## 2022-03-15 ENCOUNTER — HOSPITAL ENCOUNTER (OUTPATIENT)
Dept: SLEEP MEDICINE | Facility: HOSPITAL | Age: 27
Discharge: HOME | End: 2022-03-15
Admitting: NURSE PRACTITIONER

## 2022-03-15 DIAGNOSIS — R06.81 WITNESSED EPISODE OF APNEA: ICD-10-CM

## 2022-03-15 DIAGNOSIS — G47.19 EXCESSIVE DAYTIME SLEEPINESS: ICD-10-CM

## 2022-03-15 DIAGNOSIS — R06.83 SNORING: ICD-10-CM

## 2022-03-15 DIAGNOSIS — G47.00 FREQUENT NOCTURNAL AWAKENING: ICD-10-CM

## 2022-03-15 PROCEDURE — 95800 SLP STDY UNATTENDED: CPT

## 2022-03-15 PROCEDURE — 95800 SLP STDY UNATTENDED: CPT | Performed by: PSYCHIATRY & NEUROLOGY

## 2022-03-22 ENCOUNTER — LAB (OUTPATIENT)
Dept: LAB | Facility: HOSPITAL | Age: 27
End: 2022-03-22

## 2022-03-22 LAB
ANION GAP SERPL CALCULATED.3IONS-SCNC: 8.1 MMOL/L (ref 5–15)
BACTERIA UR QL AUTO: ABNORMAL /HPF
BILIRUB UR QL STRIP: NEGATIVE
BUN SERPL-MCNC: 10 MG/DL (ref 6–20)
BUN/CREAT SERPL: 13.5 (ref 7–25)
CALCIUM SPEC-SCNC: 8.9 MG/DL (ref 8.6–10.5)
CHLORIDE SERPL-SCNC: 104 MMOL/L (ref 98–107)
CLARITY UR: CLEAR
CO2 SERPL-SCNC: 23.9 MMOL/L (ref 22–29)
COLOR UR: YELLOW
CREAT SERPL-MCNC: 0.74 MG/DL (ref 0.57–1)
EGFRCR SERPLBLD CKD-EPI 2021: 113.2 ML/MIN/1.73
GLUCOSE SERPL-MCNC: 88 MG/DL (ref 65–99)
GLUCOSE UR STRIP-MCNC: NEGATIVE MG/DL
HGB UR QL STRIP.AUTO: NEGATIVE
HYALINE CASTS UR QL AUTO: ABNORMAL /LPF
KETONES UR QL STRIP: NEGATIVE
LEUKOCYTE ESTERASE UR QL STRIP.AUTO: NEGATIVE
NITRITE UR QL STRIP: NEGATIVE
PH UR STRIP.AUTO: 6.5 [PH] (ref 5–8)
POTASSIUM SERPL-SCNC: 4.2 MMOL/L (ref 3.5–5.2)
PROT UR QL STRIP: NEGATIVE
RBC # UR STRIP: ABNORMAL /HPF
REF LAB TEST METHOD: ABNORMAL
SODIUM SERPL-SCNC: 136 MMOL/L (ref 136–145)
SP GR UR STRIP: 1.01 (ref 1–1.03)
SQUAMOUS #/AREA URNS HPF: ABNORMAL /HPF
UROBILINOGEN UR QL STRIP: NORMAL
WBC # UR STRIP: ABNORMAL /HPF

## 2022-03-22 PROCEDURE — 81001 URINALYSIS AUTO W/SCOPE: CPT | Performed by: STUDENT IN AN ORGANIZED HEALTH CARE EDUCATION/TRAINING PROGRAM

## 2022-03-22 PROCEDURE — 80048 BASIC METABOLIC PNL TOTAL CA: CPT | Performed by: STUDENT IN AN ORGANIZED HEALTH CARE EDUCATION/TRAINING PROGRAM

## 2022-03-22 PROCEDURE — 86803 HEPATITIS C AB TEST: CPT | Performed by: STUDENT IN AN ORGANIZED HEALTH CARE EDUCATION/TRAINING PROGRAM

## 2022-03-23 LAB
HCV AB S/CO SERPL IA: 0.1 S/CO RATIO (ref 0–0.9)
HCV AB SERPL QL IA: NORMAL

## 2022-03-25 ENCOUNTER — OFFICE VISIT (OUTPATIENT)
Dept: FAMILY MEDICINE CLINIC | Facility: CLINIC | Age: 27
End: 2022-03-25

## 2022-03-25 VITALS
DIASTOLIC BLOOD PRESSURE: 74 MMHG | HEIGHT: 64 IN | HEART RATE: 79 BPM | OXYGEN SATURATION: 99 % | TEMPERATURE: 97.3 F | SYSTOLIC BLOOD PRESSURE: 104 MMHG | WEIGHT: 216 LBS | BODY MASS INDEX: 36.88 KG/M2

## 2022-03-25 DIAGNOSIS — I10 ESSENTIAL HYPERTENSION: Primary | ICD-10-CM

## 2022-03-25 DIAGNOSIS — Z23 NEED FOR TETANUS, DIPHTHERIA, AND ACELLULAR PERTUSSIS (TDAP) VACCINE: ICD-10-CM

## 2022-03-25 PROCEDURE — 90471 IMMUNIZATION ADMIN: CPT | Performed by: STUDENT IN AN ORGANIZED HEALTH CARE EDUCATION/TRAINING PROGRAM

## 2022-03-25 PROCEDURE — 90715 TDAP VACCINE 7 YRS/> IM: CPT | Performed by: STUDENT IN AN ORGANIZED HEALTH CARE EDUCATION/TRAINING PROGRAM

## 2022-03-25 PROCEDURE — 99213 OFFICE O/P EST LOW 20 MIN: CPT | Performed by: STUDENT IN AN ORGANIZED HEALTH CARE EDUCATION/TRAINING PROGRAM

## 2022-03-25 RX ORDER — LOSARTAN POTASSIUM 25 MG/1
25 TABLET ORAL DAILY
Qty: 90 TABLET | Refills: 1 | Status: SHIPPED | OUTPATIENT
Start: 2022-03-25 | End: 2022-10-27

## 2022-03-30 ENCOUNTER — OFFICE VISIT (OUTPATIENT)
Dept: SLEEP MEDICINE | Facility: HOSPITAL | Age: 27
End: 2022-03-30

## 2022-03-30 VITALS
WEIGHT: 216 LBS | BODY MASS INDEX: 36.88 KG/M2 | HEIGHT: 64 IN | OXYGEN SATURATION: 98 % | HEART RATE: 80 BPM | SYSTOLIC BLOOD PRESSURE: 137 MMHG | DIASTOLIC BLOOD PRESSURE: 85 MMHG

## 2022-03-30 DIAGNOSIS — E66.01 MORBID OBESITY: ICD-10-CM

## 2022-03-30 DIAGNOSIS — G47.19 EXCESSIVE DAYTIME SLEEPINESS: ICD-10-CM

## 2022-03-30 DIAGNOSIS — R06.81 WITNESSED EPISODE OF APNEA: ICD-10-CM

## 2022-03-30 DIAGNOSIS — R06.83 SNORING: Primary | ICD-10-CM

## 2022-03-30 DIAGNOSIS — G47.00 FREQUENT NOCTURNAL AWAKENING: ICD-10-CM

## 2022-03-30 PROCEDURE — 99213 OFFICE O/P EST LOW 20 MIN: CPT | Performed by: NURSE PRACTITIONER

## 2022-03-30 NOTE — PROGRESS NOTES
Sleep Clinic Follow Up - Sleep Study Results    CHIEF COMPLAINT: follow up sleep testing    LAST OV: 03/07/2022    HPI:  The patient is a 27 y.o. female.  I discussed the results of the recent home sleep study performed on 03/15/2022.  The AHI/ASHELY was 1, RDI was 13. Pulse range of  bpm. O2 phoebe of 89% with no sustained hypoxia.      INTERVAL MEDICAL HISTORY: No change since last office visit in regard to the patient's bedtime routine, medications, or diagnosis.      Review of Systems:  Denies chest pain, shortness of breath, leg swelling, cough, fever, chills, abdominal pain, N/V/D.    MEDICATIONS:   Current Outpatient Medications:   •  cholecalciferol (VITAMIN D3) 10 MCG (400 UNIT) tablet, Take 1 tablet by mouth Daily., Disp: 30 tablet, Rfl: 5  •  losartan (Cozaar) 25 MG tablet, Take 1 tablet by mouth Daily., Disp: 90 tablet, Rfl: 1    PHYSICAL EXAM:    Vitals:    03/30/22 0917   BP: 137/85   Pulse: 80   SpO2: 98%     Patient's Body mass index is 37.06 kg/m². indicating that she is morbidly obese (BMI > 40 or > 35 with obesity - related health condition). Obesity-related health conditions include the following: hypertension. Obesity is unchanged. BMI is is above average; BMI management plan is completed. I recommend portion control and increasing exercise.      Gen:                No distress, conversant, pleasant, appears stated age, alert, oriented  Eyes:               Anicteric sclera, moist conjunctiva, no lid lag                           PERRL, EOMI   Heent:             NC/AT                          Oropharynx clear                          Normal hearing  Lungs:             Normal effort, non-labored breathing         CV:                  Normal S1/S2                          No lower extremity edema  ABD:               Rounded, non-distended          Psych:             Appropriate affect  Neuro:             CN 2-12 appear intact      Assessment and Plan:    1. Snoring, excessive daytime sleepiness,  witnessed episode of apnea, frequent nocturnal awakening - Established, not controlled  1. The sleep study results were discussed in detail with the patient. I counseled patient on sleep hygiene, including regular sleep wake schedule and stimulus control therapy, the importance of weight reduction, and abstaining from smoking and alcohol consumption  2. Proceed with in lab PSG for further workup  3. Follow up for results  2. Morbid obesity - BMI 37.1 - stable chronic illness      All of the patient's questions were answered. She states understanding and agreement with my assessment and plan as above.     I spent 20 minutes caring for Christ on this date of service. This time includes time spent by me in the following activities: preparing for the visit, reviewing tests, obtaining and/or reviewing a separately obtained history, performing a medically appropriate examination and/or evaluation , counseling and educating the patient/family/caregiver, ordering medications, tests, or procedures, documenting information in the medical record and care coordination; discussing Study results and Further testing    RTC 2 weeks after testing. Patient agrees to return sooner if changes in symptoms.         This document has been electronically signed by MARIE Mejia on March 30, 2022 09:22 CDT            CC: Lawrence Rangel MD          No ref. provider found

## 2022-04-24 ENCOUNTER — HOSPITAL ENCOUNTER (OUTPATIENT)
Dept: SLEEP MEDICINE | Facility: HOSPITAL | Age: 27
Discharge: HOME | End: 2022-04-24
Admitting: NURSE PRACTITIONER

## 2022-04-24 DIAGNOSIS — E66.01 MORBID OBESITY: ICD-10-CM

## 2022-04-24 DIAGNOSIS — G47.00 FREQUENT NOCTURNAL AWAKENING: ICD-10-CM

## 2022-04-24 DIAGNOSIS — G47.19 EXCESSIVE DAYTIME SLEEPINESS: ICD-10-CM

## 2022-04-24 DIAGNOSIS — R06.83 SNORING: ICD-10-CM

## 2022-04-24 DIAGNOSIS — R06.81 WITNESSED EPISODE OF APNEA: ICD-10-CM

## 2022-04-24 PROCEDURE — 95810 POLYSOM 6/> YRS 4/> PARAM: CPT | Performed by: PSYCHIATRY & NEUROLOGY

## 2022-04-24 PROCEDURE — 95810 POLYSOM 6/> YRS 4/> PARAM: CPT

## 2022-05-09 ENCOUNTER — OFFICE VISIT (OUTPATIENT)
Dept: SLEEP MEDICINE | Facility: HOSPITAL | Age: 27
End: 2022-05-09

## 2022-05-09 VITALS
DIASTOLIC BLOOD PRESSURE: 85 MMHG | HEIGHT: 64 IN | HEART RATE: 85 BPM | WEIGHT: 216 LBS | BODY MASS INDEX: 36.88 KG/M2 | SYSTOLIC BLOOD PRESSURE: 136 MMHG | OXYGEN SATURATION: 97 %

## 2022-05-09 DIAGNOSIS — E66.01 MORBID OBESITY: ICD-10-CM

## 2022-05-09 DIAGNOSIS — R06.83 SNORING: Primary | ICD-10-CM

## 2022-05-09 DIAGNOSIS — G47.19 EXCESSIVE DAYTIME SLEEPINESS: ICD-10-CM

## 2022-05-09 PROCEDURE — 99212 OFFICE O/P EST SF 10 MIN: CPT | Performed by: NURSE PRACTITIONER

## 2022-06-16 ENCOUNTER — OFFICE VISIT (OUTPATIENT)
Dept: FAMILY MEDICINE CLINIC | Facility: CLINIC | Age: 27
End: 2022-06-16

## 2022-06-16 VITALS
TEMPERATURE: 98.2 F | DIASTOLIC BLOOD PRESSURE: 90 MMHG | OXYGEN SATURATION: 98 % | HEART RATE: 98 BPM | BODY MASS INDEX: 38.62 KG/M2 | SYSTOLIC BLOOD PRESSURE: 130 MMHG | HEIGHT: 64 IN | WEIGHT: 226.2 LBS

## 2022-06-16 DIAGNOSIS — J30.2 SEASONAL ALLERGIES: Primary | Chronic | ICD-10-CM

## 2022-06-16 DIAGNOSIS — R05.9 COUGH: ICD-10-CM

## 2022-06-16 DIAGNOSIS — J02.9 SORE THROAT: ICD-10-CM

## 2022-06-16 DIAGNOSIS — H65.03 BILATERAL ACUTE SEROUS OTITIS MEDIA, RECURRENCE NOT SPECIFIED: ICD-10-CM

## 2022-06-16 LAB
EXPIRATION DATE: NORMAL
INTERNAL CONTROL: NORMAL
Lab: NORMAL
S PYO AG THROAT QL: NEGATIVE

## 2022-06-16 PROCEDURE — 87880 STREP A ASSAY W/OPTIC: CPT | Performed by: NURSE PRACTITIONER

## 2022-06-16 PROCEDURE — 99213 OFFICE O/P EST LOW 20 MIN: CPT | Performed by: NURSE PRACTITIONER

## 2022-06-16 RX ORDER — CETIRIZINE HYDROCHLORIDE 10 MG/1
10 TABLET ORAL DAILY
COMMUNITY
Start: 2022-06-06

## 2022-06-16 RX ORDER — PSEUDOEPHEDRINE HYDROCHLORIDE 120 MG/1
120 TABLET, FILM COATED, EXTENDED RELEASE ORAL 2 TIMES DAILY
COMMUNITY
Start: 2022-06-06 | End: 2022-08-19

## 2022-06-16 RX ORDER — METHYLPREDNISOLONE 4 MG/1
TABLET ORAL
Qty: 21 TABLET | Refills: 0 | Status: SHIPPED | OUTPATIENT
Start: 2022-06-16 | End: 2022-08-19

## 2022-06-16 RX ORDER — BENZONATATE 100 MG/1
CAPSULE ORAL
Qty: 60 CAPSULE | Refills: 0 | Status: SHIPPED | OUTPATIENT
Start: 2022-06-16 | End: 2022-08-19

## 2022-06-16 RX ORDER — MONTELUKAST SODIUM 10 MG/1
10 TABLET ORAL NIGHTLY
Qty: 30 TABLET | Refills: 2 | Status: SHIPPED | OUTPATIENT
Start: 2022-06-16 | End: 2022-09-19

## 2022-06-16 NOTE — PROGRESS NOTES
"Chief Complaint  Illness (St ear popping  rt ear worse since last Monday)    Subjective          Christ Arias presents to [facility]IMARY [facility] - [facility] Same Day[facility]Walk [facility]    PCP: Dr. Rangel    CC: \"sore throat, ears popping\"    Has been seen x 2 at  in the last 4-6 weeks and treated with antibiotics for a sinus infection and believes she had a steroid injection about 6 weeks ago.  Last treated on 6-6 with Zithromax, nasal spray.  Felt some better, but symptoms lingering in ears and some sinus pressure.  No fever.  Noted some pain with swallowing starting yesterday.  Hx of seasonal allergies, taking Zyrtec daily.  Stopped the nasal spray when she finished the antibiotic.  No known exposures.  Concerned about strep.      Sore Throat   This is a new problem. The current episode started yesterday. The problem has been waxing and waning. The pain is worse on the right side. There has been no fever. The pain is at a severity of 5/10. Associated symptoms include congestion, coughing ( tickle in throat causes cough) and ear pain (fullness, popping). Pertinent negatives include no abdominal pain, diarrhea, drooling, ear discharge, headaches, hoarse voice, plugged ear sensation, neck pain, shortness of breath, stridor, swollen glands, trouble swallowing or vomiting. She has had no exposure to strep or mono. Treatments tried: just finished Zithromax within the last week. The treatment provided mild (felt better until yesterday) relief.       Review of Systems   Constitutional: Negative.    HENT: Positive for congestion, ear pain (fullness, popping), hearing loss (muffled at times), postnasal drip, sinus pressure (generalized, not localized), sneezing and sore throat. Negative for drooling, ear discharge, hoarse voice and trouble swallowing.    Eyes: Negative.    Respiratory: Positive for cough ( tickle in throat causes cough). Negative for chest tightness, shortness of " "breath, wheezing and stridor.    Cardiovascular: Negative.    Gastrointestinal: Negative.  Negative for abdominal pain, diarrhea and vomiting.   Genitourinary: Negative.    Musculoskeletal: Negative.  Negative for neck pain.   Skin: Negative.    Neurological: Negative for dizziness and headaches.        Objective   Vital Signs:   /90 (BP Location: Right arm, Patient Position: Sitting)   Pulse 98   Temp 98.2 °F (36.8 °C)   Ht 162.6 cm (64.02\")   Wt 103 kg (226 lb 3.2 oz)   SpO2 98%   BMI 38.80 kg/m²       Physical Exam  Vitals and nursing note reviewed.   Constitutional:       General: She is not in acute distress.     Appearance: She is obese. She is not ill-appearing.   HENT:      Head: Normocephalic and atraumatic.      Right Ear: Ear canal normal. A middle ear effusion is present. Tympanic membrane is not erythematous.      Left Ear: Ear canal normal. A middle ear effusion is present. Tympanic membrane is not erythematous.      Nose: Mucosal edema (pale) and congestion present.      Right Sinus: No maxillary sinus tenderness or frontal sinus tenderness.      Left Sinus: No maxillary sinus tenderness or frontal sinus tenderness.      Mouth/Throat:      Mouth: Mucous membranes are moist.      Pharynx: Posterior oropharyngeal erythema ( mild injection with PND) present. No oropharyngeal exudate.   Eyes:      General:         Right eye: No discharge.         Left eye: No discharge.      Conjunctiva/sclera: Conjunctivae normal.   Cardiovascular:      Rate and Rhythm: Normal rate and regular rhythm.   Pulmonary:      Effort: Pulmonary effort is normal. No respiratory distress.      Breath sounds: Normal breath sounds. No wheezing, rhonchi or rales.      Comments: hacky cough  Musculoskeletal:      Cervical back: Neck supple. No tenderness.   Lymphadenopathy:      Cervical: No cervical adenopathy.   Skin:     General: Skin is warm and dry.   Neurological:      General: No focal deficit present.      Mental " Status: She is alert and oriented to person, place, and time.   Psychiatric:         Mood and Affect: Mood normal.         Thought Content: Thought content normal.          Result Review :     Common labs    Common Labsle 1/28/22 1/28/22 1/28/22 1/28/22 3/22/22    1019 1019 1019 1019    Glucose    83 88   BUN    11 10   Creatinine    0.73 0.74   eGFR Non African Am    95    Sodium    137 136   Potassium    4.3 4.2   Chloride    101 104   Calcium    9.6 8.9   Albumin    4.40    Total Bilirubin    0.2    Alkaline Phosphatase    93    AST (SGOT)    16    ALT (SGPT)    10    WBC  8.00      Hemoglobin  12.4      Hematocrit  37.7      Platelets  418      Total Cholesterol   185     Triglycerides   114     HDL Cholesterol   45     LDL Cholesterol    119 (A)     Hemoglobin A1C 6.02 (A)       (A) Abnormal value                       Recent Results (from the past 24 hour(s))   POC Rapid Strep A    Collection Time: 06/16/22  5:28 PM    Specimen: Swab   Result Value Ref Range    Rapid Strep A Screen Negative Negative, VALID, INVALID, Not Performed    Internal Control Passed Passed    Lot Number [id number]     Expiration Date 09-         Assessment and Plan    Diagnoses and all orders for this visit:    1. Seasonal allergies (Primary)  -     methylPREDNISolone (MEDROL) 4 MG dose pack; Take as directed on package instructions.  Dispense: 21 tablet; Refill: 0  -     montelukast (Singulair) 10 MG tablet; Take 1 tablet by mouth Every Night.  Dispense: 30 tablet; Refill: 2    2. Cough  -     benzonatate (Tessalon Perles) 100 MG capsule; 1-2 caps po TID prn cough  Dispense: 60 capsule; Refill: 0    3. Sore throat  -     POC Rapid Strep A    4. Bilateral acute serous otitis media, recurrence not specified  -     methylPREDNISolone (MEDROL) 4 MG dose pack; Take as directed on package instructions.  Dispense: 21 tablet; Refill: 0      Push fluids  Rest  Continue with zyrtec daily, add singulair nightly --Rx given  Restart NS given  at  daily  Rx for Medrol pack provided  Throat lozenges PRN  Rx for Tessalon perles prn cough    See PCP or RTC if symptoms persist/worsen  See PCP for routine f/u visit and management of chronic medical conditions      This document has been electronically signed by MARIE Armenta on June 16, 2022 17:30 CDT,.

## 2022-08-19 ENCOUNTER — OFFICE VISIT (OUTPATIENT)
Dept: FAMILY MEDICINE CLINIC | Facility: CLINIC | Age: 27
End: 2022-08-19

## 2022-08-19 VITALS
OXYGEN SATURATION: 98 % | TEMPERATURE: 98 F | WEIGHT: 220 LBS | BODY MASS INDEX: 37.56 KG/M2 | HEIGHT: 64 IN | DIASTOLIC BLOOD PRESSURE: 90 MMHG | SYSTOLIC BLOOD PRESSURE: 120 MMHG | HEART RATE: 64 BPM

## 2022-08-19 DIAGNOSIS — R52 BODY ACHES: ICD-10-CM

## 2022-08-19 DIAGNOSIS — U07.1 COVID: Primary | ICD-10-CM

## 2022-08-19 DIAGNOSIS — R50.9 FEVER, UNSPECIFIED FEVER CAUSE: ICD-10-CM

## 2022-08-19 LAB
EXPIRATION DATE: ABNORMAL
FLUAV AG UPPER RESP QL IA.RAPID: NOT DETECTED
FLUBV AG UPPER RESP QL IA.RAPID: NOT DETECTED
INTERNAL CONTROL: ABNORMAL
Lab: ABNORMAL
SARS-COV-2 AG UPPER RESP QL IA.RAPID: DETECTED

## 2022-08-19 PROCEDURE — 99213 OFFICE O/P EST LOW 20 MIN: CPT | Performed by: NURSE PRACTITIONER

## 2022-08-19 PROCEDURE — 87428 SARSCOV & INF VIR A&B AG IA: CPT | Performed by: NURSE PRACTITIONER

## 2022-08-19 RX ORDER — ZINC GLUCONATE 50 MG
50 TABLET ORAL DAILY
Qty: 14 TABLET | Refills: 0 | Status: SHIPPED | OUTPATIENT
Start: 2022-08-19

## 2022-08-19 RX ORDER — MULTIVIT WITH MINERALS/LUTEIN
1000 TABLET ORAL DAILY
Qty: 14 TABLET | Refills: 0 | Status: SHIPPED | OUTPATIENT
Start: 2022-08-19

## 2022-08-19 RX ORDER — COVID-19 ANTIGEN TEST
1 KIT MISCELLANEOUS TAKE AS DIRECTED
Qty: 2 KIT | Refills: 0 | COMMUNITY
Start: 2022-08-19

## 2022-08-19 NOTE — PROGRESS NOTES
"Chief Complaint  Illness (Fever, body aches, cough, chills, fatigue)    Subjective          Christ Arias presents to [facility]IMARY [facility]ARE - [facility] Same Day[facility]Walk [facility]    PCP: Dr. Rangel    CC: \"fever, body aches, cough, chills, fatigue\"    Just returned from [place] late last night.  Started feeling ill during the afternoon yesterday.  No known exposures.  Son incidentally tested + for Covid today as well, but had not been with her the last week.  Denies any previous Covid infections.      Illness  This is a new problem. The current episode started yesterday. The problem occurs constantly. The problem has been unchanged. Associated symptoms include chills, congestion, coughing, fatigue, a fever, headaches, myalgias and a sore throat (scratchy). Pertinent negatives include no abdominal pain, anorexia, arthralgias, change in bowel habit, chest pain, diaphoresis, joint swelling, nausea, neck pain, numbness, rash, swollen glands, urinary symptoms, vertigo, visual change, vomiting or weakness. Nothing aggravates the symptoms. Treatments tried: ibuprofen. The treatment provided mild relief.       Review of Systems   Constitutional: Positive for appetite change, chills, fatigue and fever. Negative for diaphoresis.   HENT: Positive for congestion, sinus pressure and sore throat (scratchy). Negative for ear discharge, ear pain, rhinorrhea, sinus pain, sneezing and trouble swallowing.    Eyes: Negative.    Respiratory: Positive for cough. Negative for chest tightness, shortness of breath and wheezing.    Cardiovascular: Negative.  Negative for chest pain.   Gastrointestinal: Negative.  Negative for abdominal pain, anorexia, change in bowel habit, nausea and vomiting.   Genitourinary: Negative.    Musculoskeletal: Positive for myalgias. Negative for arthralgias, joint swelling and neck pain.   Skin: Negative.  Negative for rash.   Neurological: Positive for headaches. " "Negative for dizziness, vertigo, weakness and numbness.        Objective   Vital Signs:   /90 (BP Location: Right arm, Patient Position: Sitting)   Pulse 64   Temp 98 °F (36.7 °C)   Ht 162.6 cm (64\")   Wt 99.8 kg (220 lb)   SpO2 98%   BMI 37.76 kg/m²       Physical Exam  Vitals and nursing note reviewed.   Constitutional:       General: She is not in acute distress.     Appearance: She is obese. She is ill-appearing.   HENT:      Head: Normocephalic and atraumatic.      Right Ear: Tympanic membrane and ear canal normal.      Left Ear: Tympanic membrane and ear canal normal.      Nose: Congestion present.      Comments: Generalized sinus pressure, but no localized pain       Mouth/Throat:      Mouth: Mucous membranes are moist.      Pharynx: Posterior oropharyngeal erythema (mild injection) present. No oropharyngeal exudate.   Eyes:      General:         Right eye: No discharge.         Left eye: No discharge.      Conjunctiva/sclera: Conjunctivae normal.   Cardiovascular:      Rate and Rhythm: Normal rate and regular rhythm.   Pulmonary:      Effort: Pulmonary effort is normal. No respiratory distress.      Breath sounds: No wheezing, rhonchi or rales.      Comments: Loose, minimally congested cough  Musculoskeletal:      Cervical back: Neck supple. No tenderness.   Lymphadenopathy:      Cervical: No cervical adenopathy.   Skin:     General: Skin is warm and dry.   Neurological:      General: No focal deficit present.      Mental Status: She is alert and oriented to person, place, and time.   Psychiatric:         Mood and Affect: Mood normal.         Thought Content: Thought content normal.          Result Review :     Common labs    Common Labsle 1/28/22 1/28/22 1/28/22 1/28/22 3/22/22    1019 1019 1019 1019    Glucose    83 88   BUN    11 10   Creatinine    0.73 0.74   eGFR Non African Am    95    Sodium    137 136   Potassium    4.3 4.2   Chloride    101 104   Calcium    9.6 8.9   Albumin    4.40  "   Total Bilirubin    0.2    Alkaline Phosphatase    93    AST (SGOT)    16    ALT (SGPT)    10    WBC  8.00      Hemoglobin  12.4      Hematocrit  37.7      Platelets  418      Total Cholesterol   185     Triglycerides   114     HDL Cholesterol   45     LDL Cholesterol    119 (A)     Hemoglobin A1C 6.02 (A)       (A) Abnormal value            Recent Results (from the past 24 hour(s))   POCT SARS-CoV-2 Antigen HAILY + Flu    Collection Time: 08/19/22 10:39 AM    Specimen: Swab   Result Value Ref Range    SARS Antigen Detected (A) Not Detected, Presumptive Negative    Influenza A Antigen HAILY Not Detected Not Detected    Influenza B Antigen HAILY Not Detected Not Detected    Internal Control Passed Passed    Lot Number [id number]     Expiration Date 03-                  Assessment and Plan {CC Problem List  Visit Diagnosis  ROS  Review (Popup)  Health Maintenance  Quality  BestPractice  Medications  SmartSets  SnapShot Encounters  Media :23}   Diagnoses and all orders for this visit:    1. COVID (Primary)  -     COVID-19 At Home Antigen Test (QuickVue At-Home Covid-19 Test) kit; 1 kit by In Vitro route Take As Directed.  Dispense: 2 kit; Refill: 0  -     ascorbic acid (VITAMIN C) 1000 MG tablet; Take 1 tablet by mouth Daily.  Dispense: 14 tablet; Refill: 0  -     zinc gluconate 50 MG tablet; Take 1 tablet by mouth Daily.  Dispense: 14 tablet; Refill: 0    2. Fever, unspecified fever cause  -     POCT SARS-CoV-2 Antigen HAILY + Flu    3. Body aches  -     POCT SARS-CoV-2 Antigen HAILY + Flu      Push fluids  Rest  Encouraged immune boosters--Rx for for Vit C, Zinc provided  Has tessalon perles at home as needed for cough. Will restart antihistamines she has at home.   Tylenol or Motrin as needed for fever, aches  Antivirals discussed--risks/benefits/emergency use--declines Rx at this time  Quarantine instructions given   RTW: 8- if asymptomatic, continue to mask for additional 5 days.  If still  symptomatic, notify office, will need full 10 day quarantine  [facility]    See PCP or RTC if symptoms persist/worsen  See PCP for routine f/u visit and management of chronic medical conditions      This document has been electronically signed by MARIE Armenta on August 19, 2022 13:20 CDT,.

## 2022-09-19 DIAGNOSIS — J30.2 SEASONAL ALLERGIES: Chronic | ICD-10-CM

## 2022-09-19 RX ORDER — MONTELUKAST SODIUM 10 MG/1
TABLET ORAL
Qty: 30 TABLET | Refills: 0 | Status: SHIPPED | OUTPATIENT
Start: 2022-09-19 | End: 2022-10-27

## 2022-09-19 NOTE — TELEPHONE ENCOUNTER
Rx Refill Note  Requested Prescriptions     Pending Prescriptions Disp Refills   • montelukast (SINGULAIR) 10 MG tablet [Pharmacy Med Name: Montelukast Sodium 10 MG Oral Tablet] 30 tablet 0     Sig: TAKE 1 TABLET BY MOUTH ONCE DAILY AT NIGHT      Last office visit with prescribing clinician:  3/25/2022      Next office visit with prescribing clinician: 9/26/22    TIP  Encounters:23}         Adrianna Milan MA  09/19/22, 12:15 CDT

## 2022-09-26 ENCOUNTER — OFFICE VISIT (OUTPATIENT)
Dept: FAMILY MEDICINE CLINIC | Facility: CLINIC | Age: 27
End: 2022-09-26

## 2022-09-26 ENCOUNTER — LAB (OUTPATIENT)
Dept: LAB | Facility: HOSPITAL | Age: 27
End: 2022-09-26

## 2022-09-26 VITALS
TEMPERATURE: 96.8 F | SYSTOLIC BLOOD PRESSURE: 122 MMHG | BODY MASS INDEX: 38.28 KG/M2 | HEIGHT: 64 IN | OXYGEN SATURATION: 99 % | WEIGHT: 224.2 LBS | DIASTOLIC BLOOD PRESSURE: 78 MMHG | HEART RATE: 81 BPM

## 2022-09-26 DIAGNOSIS — I10 PRIMARY HYPERTENSION: Primary | ICD-10-CM

## 2022-09-26 DIAGNOSIS — R73.03 PREDIABETES: ICD-10-CM

## 2022-09-26 LAB — HBA1C MFR BLD: 5.8 % (ref 4.8–5.6)

## 2022-09-26 PROCEDURE — 83036 HEMOGLOBIN GLYCOSYLATED A1C: CPT | Performed by: STUDENT IN AN ORGANIZED HEALTH CARE EDUCATION/TRAINING PROGRAM

## 2022-09-26 PROCEDURE — 99213 OFFICE O/P EST LOW 20 MIN: CPT | Performed by: STUDENT IN AN ORGANIZED HEALTH CARE EDUCATION/TRAINING PROGRAM

## 2022-10-27 DIAGNOSIS — I10 ESSENTIAL HYPERTENSION: ICD-10-CM

## 2022-10-27 DIAGNOSIS — J30.2 SEASONAL ALLERGIES: Chronic | ICD-10-CM

## 2022-10-27 RX ORDER — MONTELUKAST SODIUM 10 MG/1
TABLET ORAL
Qty: 30 TABLET | Refills: 0 | Status: SHIPPED | OUTPATIENT
Start: 2022-10-27

## 2022-10-27 RX ORDER — LOSARTAN POTASSIUM 25 MG/1
TABLET ORAL
Qty: 90 TABLET | Refills: 0 | Status: SHIPPED | OUTPATIENT
Start: 2022-10-27 | End: 2023-01-25

## 2022-10-27 NOTE — TELEPHONE ENCOUNTER
Rx Refill Note  Requested Prescriptions     Pending Prescriptions Disp Refills   • losartan (COZAAR) 25 MG tablet [Pharmacy Med Name: Losartan Potassium 25 MG Oral Tablet] 90 tablet 0     Sig: Take 1 tablet by mouth once daily      Last office visit with prescribing clinician: 9/26/2022      Next office visit with prescribing clinician: 3/27/2023            Adrianna Milan MA  10/27/22, 12:30 CDT

## 2022-10-27 NOTE — TELEPHONE ENCOUNTER
Rx Refill Note  Requested Prescriptions     Signed Prescriptions Disp Refills   • montelukast (SINGULAIR) 10 MG tablet 30 tablet 0     Sig: TAKE 1 TABLET BY MOUTH ONCE DAILY AT NIGHT     Authorizing Provider: ADEEL SIMENTAL     Ordering User: ADRIANNA RAYMOND      Last office visit with prescribing clinician: 9/26/2022    (Dr Simental)  Next office visit with prescribing clinician: 3/27/23 (Dr Simental)             Adrianna Raymond MA  10/27/22, 12:32 CDT

## 2022-11-30 ENCOUNTER — OFFICE VISIT (OUTPATIENT)
Dept: FAMILY MEDICINE CLINIC | Facility: CLINIC | Age: 27
End: 2022-11-30

## 2022-11-30 VITALS
HEART RATE: 92 BPM | HEIGHT: 64 IN | SYSTOLIC BLOOD PRESSURE: 148 MMHG | TEMPERATURE: 98.5 F | OXYGEN SATURATION: 99 % | DIASTOLIC BLOOD PRESSURE: 88 MMHG | BODY MASS INDEX: 37.73 KG/M2 | WEIGHT: 221 LBS

## 2022-11-30 DIAGNOSIS — L85.3 DRY SKIN: ICD-10-CM

## 2022-11-30 DIAGNOSIS — R21 RASH OF FOOT: Primary | ICD-10-CM

## 2022-11-30 PROCEDURE — 99213 OFFICE O/P EST LOW 20 MIN: CPT | Performed by: NURSE PRACTITIONER

## 2022-11-30 RX ORDER — CLOTRIMAZOLE AND BETAMETHASONE DIPROPIONATE 10; .64 MG/G; MG/G
1 CREAM TOPICAL 2 TIMES DAILY
Qty: 45 G | Refills: 1 | Status: SHIPPED | OUTPATIENT
Start: 2022-11-30 | End: 2023-03-27

## 2022-11-30 RX ORDER — EMOLLIENT BASE
1 CREAM (GRAM) TOPICAL 2 TIMES DAILY
Qty: 1700 G | Refills: 1 | Status: SHIPPED | OUTPATIENT
Start: 2022-11-30

## 2022-11-30 NOTE — PROGRESS NOTES
"Chief Complaint  Rash ([place] hands and nuckles X 1 month spots getting darker/Bottom of left foot rash)    Subjective          Christ Arais presents to [facility] - [place]    History of Present Illness  [facility]    PCP: Dr. Rangel     CC: \"rash on hands and foot\"    C/O two rashes:     Bilateral hands/knuckles present x 1 month.  Using a neutrogena lotion at night, but not improving.  Dry and crack open at times and areas seem darker than surrounding skin.  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     Rash to left foot present for several months. Started with a couple of areas, now has spread.  Will have tiny blisters that develop, will pop, it dries up temporarily, but then returns.  C/O itching/burning at rash site.  Not tried anything to assist other than changing to different shoes every couple of days.            Review of Systems   Constitutional: Negative.    HENT: Negative.    Eyes: Negative.    Respiratory: Negative.    Cardiovascular: Negative.    Genitourinary: Negative.    Musculoskeletal: Negative.    Skin: Positive for rash (hands, foot).   Neurological: Negative for dizziness and headaches.        Objective   Vital Signs:   /88 (BP Location: Right arm, Patient Position: Sitting, Cuff Size: Large Adult)   Pulse 92   Temp 98.5 °F (36.9 °C) (Oral)   Ht 162.6 cm (64\")   Wt 100 kg (221 lb)   SpO2 99%   BMI 37.93 kg/m²       Physical Exam  Vitals and nursing note reviewed.   Constitutional:       General: She is not in acute distress.     Appearance: She is obese. She is not ill-appearing.   HENT:      Head: Normocephalic and atraumatic.   Musculoskeletal:      Cervical back: Neck supple.        Feet:    Skin:     General: Skin is warm and dry.      Findings: Rash present.          Neurological:      General: No focal deficit present.      Mental Status: She is alert and " oriented to person, place, and time.   Psychiatric:         Mood and Affect: Mood normal.         Thought Content: Thought content normal.          Result Review :                 Assessment and Plan    Diagnoses and all orders for this visit:    1. Rash of foot (Primary)  Comments:  left  Orders:  -     clotrimazole-betamethasone (Lotrisone) 1-0.05 % cream; Apply 1 application topically to the appropriate area as directed 2 (Two) Times a Day. To left foot x 2 weeks  Dispense: 45 g; Refill: 1    2. Dry skin  Comments:  bilateral hands  Orders:  -     emollient (BIAFINE) cream; Apply 1 application topically to the appropriate area as directed 2 (Two) Times a Day.  Dispense: 1700 g; Refill: 1  -     triamcinolone (KENALOG) 0.1 % ointment; Apply 1 application topically to the appropriate area as directed 2 (Two) Times a Day. To bilateral rash on hands fo up to 2 weeks  Dispense: 80 g; Refill: 0      Suspect possible tinea on left foot--Rx for Lotrisone x 2 weeks.  Wear cotton socks, keep feet dry.     Dry skin over hands.  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    Rx for Triamcinalone ointments for up to 10 days and then begin using emollient cream BID.     See PCP or RTC if symptoms persist/worsen  See PCP for routine f/u visit and management of chronic medical conditions      This document has been electronically signed by MARIE Armenta on November 30, 2022 14:44 CST,.

## 2023-01-25 DIAGNOSIS — I10 ESSENTIAL HYPERTENSION: ICD-10-CM

## 2023-01-25 RX ORDER — LOSARTAN POTASSIUM 25 MG/1
TABLET ORAL
Qty: 90 TABLET | Refills: 0 | Status: SHIPPED | OUTPATIENT
Start: 2023-01-25 | End: 2023-03-27 | Stop reason: SDUPTHER

## 2023-02-24 ENCOUNTER — OFFICE VISIT (OUTPATIENT)
Dept: FAMILY MEDICINE CLINIC | Facility: CLINIC | Age: 28
End: 2023-02-24
Payer: MEDICAID

## 2023-02-24 VITALS
SYSTOLIC BLOOD PRESSURE: 100 MMHG | OXYGEN SATURATION: 99 % | HEART RATE: 96 BPM | DIASTOLIC BLOOD PRESSURE: 80 MMHG | TEMPERATURE: 97.4 F | HEIGHT: 64 IN | BODY MASS INDEX: 37.93 KG/M2

## 2023-02-24 DIAGNOSIS — H65.01 RIGHT ACUTE SEROUS OTITIS MEDIA, RECURRENCE NOT SPECIFIED: ICD-10-CM

## 2023-02-24 DIAGNOSIS — R09.81 NASAL CONGESTION: Primary | ICD-10-CM

## 2023-02-24 PROCEDURE — 99213 OFFICE O/P EST LOW 20 MIN: CPT | Performed by: NURSE PRACTITIONER

## 2023-02-24 RX ORDER — PREDNISONE 20 MG/1
TABLET ORAL
Qty: 4 TABLET | Refills: 0 | Status: SHIPPED | OUTPATIENT
Start: 2023-02-24 | End: 2023-03-27

## 2023-02-24 RX ORDER — FLUTICASONE PROPIONATE 50 MCG
2 SPRAY, SUSPENSION (ML) NASAL DAILY
Qty: 16 G | Refills: 0 | Status: SHIPPED | OUTPATIENT
Start: 2023-02-24

## 2023-02-24 NOTE — PROGRESS NOTES
"Chief Complaint  Illness (Stuffy nose X 2 wks)    Subjective          Christ Arias presents to [facility] - [place]    History of Present Illness  [facility]    PCP: Dr. Ranegl    CC: \"congestion\"    Reports she was seen at  2 weeks ago and tested + for Strep.  Treated with Zithromax, throat improved, but now having significant nasal congestion and dryness.  No fever, facial pain or dizziness noted.  Ears feel full.  Tried Afrin OTC and helped for the first 3 doses, but only helped for about an hour yesterday.      URI   This is a new problem. The current episode started in the past 7 days. The problem has been gradually worsening. There has been no fever. Associated symptoms include congestion (severe nasal congestion) and a plugged ear sensation. Pertinent negatives include no abdominal pain, chest pain, coughing, diarrhea, dysuria, ear pain, headaches, joint pain, joint swelling, nausea, neck pain, rash, rhinorrhea, sinus pain, sneezing, sore throat (dry in the AM only, mouth breathing due to congestion), swollen glands, vomiting or wheezing. Treatments tried: afrin. The treatment provided mild (helped for the first few doses only) relief.       Review of Systems   Constitutional: Negative.    HENT: Positive for congestion (severe nasal congestion). Negative for ear discharge, ear pain, postnasal drip, rhinorrhea, sinus pressure, sinus pain, sneezing, sore throat (dry in the AM only, mouth breathing due to congestion) and trouble swallowing.    Eyes: Negative.    Respiratory: Negative.  Negative for cough and wheezing.    Cardiovascular: Negative.  Negative for chest pain.   Gastrointestinal: Negative.  Negative for abdominal pain, diarrhea, nausea and vomiting.   Genitourinary: Negative.  Negative for dysuria.   Musculoskeletal: Negative.  Negative for joint pain and neck pain.   Skin: Negative.  Negative for rash.   Neurological: Negative " "for dizziness and headaches.        Objective   Vital Signs:   /80 (BP Location: Right arm, Patient Position: Sitting, Cuff Size: Large Adult)   Pulse 96   Temp 97.4 °F (36.3 °C) (Oral)   Ht 162.6 cm (64\")   SpO2 99%   BMI 37.93 kg/m²       Physical Exam  Vitals and nursing note reviewed.   Constitutional:       General: She is not in acute distress.     Appearance: She is not ill-appearing.   HENT:      Head: Normocephalic and atraumatic.      Right Ear: Ear canal normal. A middle ear effusion (small) is present. Tympanic membrane is bulging. Tympanic membrane is not erythematous.      Left Ear: Tympanic membrane and ear canal normal.      Nose: Mucosal edema and congestion present.      Right Turbinates: Swollen.      Left Turbinates: Swollen.      Right Sinus: No maxillary sinus tenderness or frontal sinus tenderness.      Left Sinus: No maxillary sinus tenderness or frontal sinus tenderness.      Mouth/Throat:      Mouth: Mucous membranes are moist.      Pharynx: Oropharynx is clear. No oropharyngeal exudate or posterior oropharyngeal erythema.   Eyes:      General:         Right eye: No discharge.         Left eye: No discharge.      Conjunctiva/sclera: Conjunctivae normal.   Cardiovascular:      Rate and Rhythm: Normal rate and regular rhythm.   Pulmonary:      Effort: Pulmonary effort is normal. No respiratory distress.      Breath sounds: Normal breath sounds. No wheezing, rhonchi or rales.   Musculoskeletal:      Cervical back: Neck supple. No tenderness.   Lymphadenopathy:      Cervical: No cervical adenopathy.   Skin:     General: Skin is warm and dry.   Neurological:      General: No focal deficit present.      Mental Status: She is alert and oriented to person, place, and time.   Psychiatric:         Mood and Affect: Mood normal.         Thought Content: Thought content normal.          Result Review :     Common labs    Common Labs 3/22/22 9/26/22   Glucose 88    BUN 10    Creatinine 0.74  "   Sodium 136    Potassium 4.2    Chloride 104    Calcium 8.9    Hemoglobin A1C  5.80 (A)   (A) Abnormal value                      Assessment and Plan    Diagnoses and all orders for this visit:    1. Nasal congestion (Primary)  -     predniSONE (DELTASONE) 20 MG tablet; 2 tabs po daily on first day, then 1 tab po daily  Dispense: 4 tablet; Refill: 0  -     fluticasone (FLONASE) 50 MCG/ACT nasal spray; 2 sprays into the nostril(s) as directed by provider Daily.  Dispense: 16 g; Refill: 0    2. Right acute serous otitis media, recurrence not specified  -     predniSONE (DELTASONE) 20 MG tablet; 2 tabs po daily on first day, then 1 tab po daily  Dispense: 4 tablet; Refill: 0  -     fluticasone (FLONASE) 50 MCG/ACT nasal spray; 2 sprays into the nostril(s) as directed by provider Daily.  Dispense: 16 g; Refill: 0      Rx for Prednisone x 3 days provided   Start Flonase daily for the next week and then PRN  Cool mist humidifier at night  Discontinue use of Afrin    See PCP or RTC if symptoms persist/worsen or if facial pain/ear pain/fever develop  See PCP for routine f/u visit and management of chronic medical conditions      This document has been electronically signed by MARIE Armenta on February 24, 2023 11:56 CST,.

## 2023-03-27 ENCOUNTER — OFFICE VISIT (OUTPATIENT)
Dept: FAMILY MEDICINE CLINIC | Facility: CLINIC | Age: 28
End: 2023-03-27
Payer: MEDICAID

## 2023-03-27 VITALS
TEMPERATURE: 98.9 F | BODY MASS INDEX: 38.58 KG/M2 | DIASTOLIC BLOOD PRESSURE: 90 MMHG | HEART RATE: 94 BPM | SYSTOLIC BLOOD PRESSURE: 120 MMHG | OXYGEN SATURATION: 99 % | HEIGHT: 64 IN | WEIGHT: 226 LBS

## 2023-03-27 DIAGNOSIS — I10 ESSENTIAL HYPERTENSION: ICD-10-CM

## 2023-03-27 DIAGNOSIS — L30.9 DERMATITIS: Primary | ICD-10-CM

## 2023-03-27 DIAGNOSIS — K21.9 GASTROESOPHAGEAL REFLUX DISEASE, UNSPECIFIED WHETHER ESOPHAGITIS PRESENT: ICD-10-CM

## 2023-03-27 PROCEDURE — 1159F MED LIST DOCD IN RCRD: CPT | Performed by: STUDENT IN AN ORGANIZED HEALTH CARE EDUCATION/TRAINING PROGRAM

## 2023-03-27 PROCEDURE — 1160F RVW MEDS BY RX/DR IN RCRD: CPT | Performed by: STUDENT IN AN ORGANIZED HEALTH CARE EDUCATION/TRAINING PROGRAM

## 2023-03-27 PROCEDURE — 99214 OFFICE O/P EST MOD 30 MIN: CPT | Performed by: STUDENT IN AN ORGANIZED HEALTH CARE EDUCATION/TRAINING PROGRAM

## 2023-03-27 RX ORDER — OMEPRAZOLE 40 MG/1
40 CAPSULE, DELAYED RELEASE ORAL DAILY
Qty: 90 CAPSULE | Refills: 0 | Status: SHIPPED | OUTPATIENT
Start: 2023-03-27

## 2023-03-27 RX ORDER — LOSARTAN POTASSIUM 25 MG/1
TABLET ORAL
Qty: 90 TABLET | Refills: 3 | Status: SHIPPED | OUTPATIENT
Start: 2023-03-27

## 2023-03-27 NOTE — PROGRESS NOTES
"Subjective:  Christ Arias is a 27 y.o. female who presents for     Hypertension; currently on losartan 25 mg daily.  States that blood pressure has been slightly elevated.  Does not have any issues with medications. Denies any cardiac symptoms, chest pain, shortness of breath, headaches, vision changes, numbness, tingling, weakness, leg swelling, orthopnea, dyspnea on exertion.    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 States that it has not happened before, but rash has become more red and itchy.      GERD; currently not on medication.  States that symptoms have been uncontrolled for months, happens daily, worse with certain foods. Denies any hematochezia, hematemesis, early satiety, abdominal pain, dysphagia, weight loss.  Has not had an EGD in the past.    There is no problem list on file for this patient.    Vitals:    Vitals:    03/27/23 0824   BP: 120/90   Pulse: 94   Temp: 98.9 °F (37.2 °C)   SpO2: 99%   Weight: 103 kg (226 lb)   Height: 162.6 cm (64\")     Body mass index is 38.79 kg/m².      Current Outpatient Medications:   •  ascorbic acid (VITAMIN C) 1000 MG tablet, Take 1 tablet by mouth Daily., Disp: 14 tablet, Rfl: 0  •  cetirizine (zyrTEC) 10 MG tablet, Take 1 tablet by mouth Daily. FOR 30 DAYS, Disp: , Rfl:   •  cholecalciferol (VITAMIN D3) 10 MCG (400 UNIT) tablet, Take 1 tablet by mouth Daily., Disp: 30 tablet, Rfl: 5  •  COVID-19 At Home Antigen Test (QuickVue At-Home Covid-19 Test) kit, 1 kit by In Vitro route Take As Directed., Disp: 2 kit, Rfl: 0  •  emollient (BIAFINE) cream, Apply 1 application topically to the appropriate area as directed 2 (Two) Times a Day., Disp: 1700 g, Rfl: 1  •  losartan (COZAAR) 25 MG tablet, Take 1-2 tablets daily as needed for HTN., Disp: 90 tablet, Rfl: 3  •  montelukast (SINGULAIR) 10 MG tablet, TAKE 1 TABLET BY MOUTH ONCE DAILY AT NIGHT, Disp: 30 tablet, Rfl: 0  •  zinc gluconate 50 MG tablet, Take 1 tablet by mouth " Daily., Disp: 14 tablet, Rfl: 0  •  fluticasone (FLONASE) 50 MCG/ACT nasal spray, 2 sprays into the nostril(s) as directed by provider Daily. (Patient not taking: Reported on 3/27/2023), Disp: 16 g, Rfl: 0  •  omeprazole (priLOSEC) 40 MG capsule, Take 1 capsule by mouth Daily., Disp: 90 capsule, Rfl: 0  •  triamcinolone (KENALOG) 0.1 % ointment, Apply 1 application topically to the appropriate area as directed 2 (Two) Times a Day. For use on body, 2 weeks on, 1 week off., Disp: 15 g, Rfl: 3    There is no problem list on file for this patient.    Past Surgical History:   Procedure Laterality Date   •  SECTION      x2     Social History     Socioeconomic History   • Marital status:    Tobacco Use   • Smoking status: Never   • Smokeless tobacco: Never   Vaping Use   • Vaping Use: Never used   Substance and Sexual Activity   • Alcohol use: Not Currently     Comment: one a year   • Drug use: Never   • Sexual activity: Yes     Partners: Male     Birth control/protection: Surgical     Comment: bilateral tubal ligation- clamps     Family History   Problem Relation Age of Onset   • Hypertension Father    • Diabetes Father    • Hypertension Mother    • Diabetes Mother      No visits with results within 6 Month(s) from this visit.   Latest known visit with results is:   Office Visit on 2022   Component Date Value Ref Range Status   • Hemoglobin A1C 2022 5.80 (H)  4.80 - 5.60 % Final      Polysomnography 4 or More Parameters  [facility]  [address] [place] [address]  Phone: [phone]  Fax: [phone]    Diagnostic Polysomnography Interpretation    Patient's Name: Christ Arias   YOB: 1995  Date of Study: 2022  Referring provider: MARIE Astudillo  Primary Care Provider: Lawrence Rangel MD    History Of Present Illness:  Ms. Christ Arias is a 27 y.o. female   with a There is no height or weight  on file to calculate BMI.  Concurrent   medical history includes HTN.  Concurrent medications include Losartan.    Patient presents for a polysomnogram for complaints of snoring, witnessed   apneas, excessive daytime sleepiness and unrefreshing sleep.  Non   diagnostic home sleep testing.    Polysomnography:  The patient's sleep [facility]  Sleep was monitored in accordance with   recommended AASM guidelines.  The recording also included oral/nasal   airflow, chest and abdominal respiratory effort, nasal pressure, single   channel EKG, intercostal EMG, bilateral tibialis EMG, and oxygen   saturation (by pulse oximeter).    Technical Comments:  Technically adequate study.     EEG & Sleep:  --There was no evidence of parasomnias (night terrors, sleepwalking, etc.)   on study night.   --No noted EEG abnormalities.     --Sleep onset occurred after 6 minutes, diminished.   --Sleep efficiency was 93%; expected is >80% in age 65 and less.    --Total sleep time was 433 minutes.    --Wake after sleep onset was 28 minutes.     --Sleep architecture:   --NREM and REM sleep were noted.    --REM latency of 96 min, unremarkable.    --Four cycles of sleep were observed.                --Sleep stages as a percent of total sleep time during this   study:           --> Stage N1 sleep: 03%; expected: ~ 5%           --> Stage N2 sleep: 64%; expected: ~ 50%           --> Stage N3 sleep: 15%; expected: ~ 20%           --> Stage REM sleep: 18%; expected: ~ 20% - 25%    Respiratory:  --Snoring was noted during the study (for 11% of total sleep time).  --Supine sleep was observed for 41% of the study.     --The overall apnea-hypopnea index (AHI) was 03 / hr which includes only   apneas and hypopneas with 4% desaturations noted.     --OAHI:  03 / hr.     --MELANIE:  0 / hr.     --REM AHI:  08 / hr.     --No significant positional component was observed.     --Mean oxygen saturation asleep was 96%.  "   --The oxygen phoebe was 90%.      Arousals:   --The overall arousal index was 09 / hr.           Cardiac/EKG:   --Normal sinus rhythm without significant ectopy.    --Mean heart rate of 80 bpm.  Range of 64 bpm to 102 bpm.      EMG / Periodic Limb Movements:    --The periodic limb movement index was 02 / hr, with 0.3 / hr leading to   arousal.   --Expected index is < 15 / hr.      Study Conditions:   --Head of the bed: flat  --Supplemental oxygen: none  --Medications used on night of study: none specified    Subjective:   --The post sleep study questionnaire indicated the following:   --> How refreshing was your sleep last night?:  \"Moderately\"   --> How satisfactory was your sleep last night?:  \"Moderately\"   --> How did your sleep compare to home: \"Same\"    Assessment:  Ms. Christ Arias is a 27 y.o. female whose   polysomnogram reveals:   1. That although some respiratory events were noted, the number of overall   events is not sufficient to diagnose sleep disordered   breathing/obstructive sleep apnea; this test is non-diagnostic.   2. No nocturnal hypoxia.  3. Infrequent snoring.    Planning & Recommendations:  1. Follow-up with the [facility] for full discussion of results   and treatment planning.  2. Low threshold for future testing for any worsening signs or symptoms.   3. Call with any questions or concerns.    Nabil Patel II, MD  Sleep Medicine  04/30/22 @ 17:15 CDT    CC:    • Lawrence Rangel MD  • Pam Deleon APRN      [email or web address]@  Immunization History   Administered Date(s) Administered   • Tdap 03/25/2022     The following portions of the patient's history were reviewed and updated as appropriate: allergies, current medications, past family history, past medical history, past social history, past surgical history and problem list.    PHQ-9 Total Score: 7           Physical Exam  Constitutional:       Appearance: Normal appearance. She is obese.   HENT:      Head: " Normocephalic and atraumatic.      Right Ear: External ear normal.      Left Ear: External ear normal.   Eyes:      General:         Right eye: No discharge.         Left eye: No discharge.      Conjunctiva/sclera: Conjunctivae normal.   Cardiovascular:      Rate and Rhythm: Normal rate and regular rhythm.      Pulses: Normal pulses.      Heart sounds: Normal heart sounds. No murmur heard.  Pulmonary:      Effort: Pulmonary effort is normal. No respiratory distress.      Breath sounds: Normal breath sounds.   Abdominal:      General: There is no distension.      Palpations: Abdomen is soft.      Tenderness: There is no abdominal tenderness.   Musculoskeletal:      Cervical back: Normal range of motion.      Right lower leg: No edema.      Left lower leg: No edema.   Lymphadenopathy:      Cervical: No cervical adenopathy.   Skin:     Findings: Erythema and rash ( Left forearm) present. Rash is papular.   Neurological:      Mental Status: She is alert. Mental status is at baseline.   Psychiatric:         Mood and Affect: Mood normal.         Behavior: Behavior normal.       Assessment & Plan    Diagnosis Plan   1. Dermatitis  triamcinolone (KENALOG) 0.1 % ointment      2. Essential hypertension  losartan (COZAAR) 25 MG tablet      3. Gastroesophageal reflux disease, unspecified whether esophagitis present  H. Pylori Antigen, Stool - Stool, Per Rectum    omeprazole (priLOSEC) 40 MG capsule           Orders Placed This Encounter   Procedures   • H. Pylori Antigen, Stool - Stool, Per Rectum     Standing Status:   Future     Standing Expiration Date:   3/27/2024     Dermatitis; unclear etiology, eczema versus bug bites.  Treat with topical steroid as above, continue to monitor, no red flags on exam, reassuring.    Hypertension; does note have issues with current medications, blood pressure has been well controlled.  Discussed importance of blood pressure control, risk of uncontrolled hypertension, continue to work on  diet, exercise, weight loss.  After discussion, will continue with current medication.  Follow-up in 3 months with blood pressure log or sooner if needed.  Given strict ER/cardiac precautions.    GERD; No reported red flags, exam reassuring.  After discussion with patient, will test for H. pylori, treat with PPI as above.  Low threshold to obtain EGD as needed/indicated.  Patient voiced understanding, agreeable to plan          This document has been electronically signed by Lawrence Rangel MD on March 27, 2023 13:08 CDT    EMR Dragon/Transciption Disclaimer: Some of this note may be an electronic transcription/translation of spoken language to printed text.  The electronic translation of spoken language may permit erroneous, or at times, nonsensical words or phrases to be inadvertently transcribed. Although I have reviewed the note for such errors, some may still exist.

## 2023-09-25 ENCOUNTER — LAB (OUTPATIENT)
Dept: LAB | Facility: HOSPITAL | Age: 28
End: 2023-09-25
Payer: MEDICAID

## 2023-09-25 DIAGNOSIS — K21.9 GASTROESOPHAGEAL REFLUX DISEASE, UNSPECIFIED WHETHER ESOPHAGITIS PRESENT: ICD-10-CM

## 2023-09-25 PROCEDURE — 87338 HPYLORI STOOL AG IA: CPT

## 2023-09-29 ENCOUNTER — OFFICE VISIT (OUTPATIENT)
Dept: FAMILY MEDICINE CLINIC | Facility: CLINIC | Age: 28
End: 2023-09-29
Payer: MEDICAID

## 2023-09-29 VITALS
OXYGEN SATURATION: 98 % | HEIGHT: 64 IN | HEART RATE: 88 BPM | TEMPERATURE: 98.3 F | WEIGHT: 225.6 LBS | DIASTOLIC BLOOD PRESSURE: 78 MMHG | SYSTOLIC BLOOD PRESSURE: 126 MMHG | BODY MASS INDEX: 38.51 KG/M2

## 2023-09-29 DIAGNOSIS — K58.0 IRRITABLE BOWEL SYNDROME WITH DIARRHEA: ICD-10-CM

## 2023-09-29 DIAGNOSIS — K21.9 GASTROESOPHAGEAL REFLUX DISEASE, UNSPECIFIED WHETHER ESOPHAGITIS PRESENT: Primary | ICD-10-CM

## 2023-09-29 DIAGNOSIS — I10 ESSENTIAL HYPERTENSION: ICD-10-CM

## 2023-09-29 LAB — H PYLORI AG STL QL IA: NEGATIVE

## 2023-09-29 PROCEDURE — 99213 OFFICE O/P EST LOW 20 MIN: CPT | Performed by: STUDENT IN AN ORGANIZED HEALTH CARE EDUCATION/TRAINING PROGRAM

## 2023-09-29 RX ORDER — LOSARTAN POTASSIUM 25 MG/1
TABLET ORAL
Qty: 90 TABLET | Refills: 3 | Status: SHIPPED | OUTPATIENT
Start: 2023-09-29

## 2023-09-29 NOTE — PROGRESS NOTES
"Subjective:  Christ Arias is a 28 y.o. female who presents for     GERD; currently on Omeprazole 40mg daily.  States that symptoms have been partially controlled. Denies any issues with medications, hematochezia, hematemesis, early satiety, abdominal pain, dysphagia, weight loss.  Has not had an EGD in the past.  Does admit to bloating after eating, which is resolved with loose bowel movements daily.  Denies any constipation.    Hypertension; currently on Losartan 25mg daily.  States that blood pressure has been well controlled.  Does not have any issues with medications. Denies any cardiac symptoms, chest pain, shortness of breath, headaches, vision changes, numbness, tingling, weakness, leg swelling, orthopnea, dyspnea on exertion.    There is no problem list on file for this patient.    Vitals:    Vitals:    09/29/23 0917   BP: 126/78   Pulse: 88   Temp: 98.3 °F (36.8 °C)   TempSrc: Oral   SpO2: 98%   Weight: 102 kg (225 lb 9.6 oz)   Height: 162.6 cm (64\")     Body mass index is 38.72 kg/m².      Current Outpatient Medications:     ascorbic acid (VITAMIN C) 1000 MG tablet, Take 1 tablet by mouth Daily., Disp: 14 tablet, Rfl: 0    cetirizine (zyrTEC) 10 MG tablet, Take 1 tablet by mouth Daily. FOR 30 DAYS, Disp: , Rfl:     cholecalciferol (VITAMIN D3) 10 MCG (400 UNIT) tablet, Take 1 tablet by mouth Daily., Disp: 30 tablet, Rfl: 5    COVID-19 At Home Antigen Test (QuickVue At-Home Covid-19 Test) kit, 1 kit by In Vitro route Take As Directed., Disp: 2 kit, Rfl: 0    emollient (BIAFINE) cream, Apply 1 application topically to the appropriate area as directed 2 (Two) Times a Day., Disp: 1700 g, Rfl: 1    fluticasone (FLONASE) 50 MCG/ACT nasal spray, 2 sprays into the nostril(s) as directed by provider Daily., Disp: 16 g, Rfl: 0    losartan (COZAAR) 25 MG tablet, Take 1-2 tablets daily as needed for HTN., Disp: 90 tablet, Rfl: 3    montelukast (SINGULAIR) 10 MG tablet, TAKE 1 TABLET BY MOUTH ONCE DAILY AT " NIGHT, Disp: 30 tablet, Rfl: 0    omeprazole (priLOSEC) 40 MG capsule, Take 1 capsule by mouth Daily As Needed (Acid reflux)., Disp: 90 capsule, Rfl: 1    triamcinolone (KENALOG) 0.1 % ointment, Apply 1 application topically to the appropriate area as directed 2 (Two) Times a Day. For use on body, 2 weeks on, 1 week off., Disp: 15 g, Rfl: 3    zinc gluconate 50 MG tablet, Take 1 tablet by mouth Daily., Disp: 14 tablet, Rfl: 0    There is no problem list on file for this patient.    Past Surgical History:   Procedure Laterality Date     SECTION      x2     Social History     Socioeconomic History    Marital status:    Tobacco Use    Smoking status: Never    Smokeless tobacco: Never   Vaping Use    Vaping Use: Never used   Substance and Sexual Activity    Alcohol use: Not Currently     Comment: one a year    Drug use: Never    Sexual activity: Yes     Partners: Male     Birth control/protection: Surgical     Comment: bilateral tubal ligation- clamps     Family History   Problem Relation Age of Onset    Hypertension Father     Diabetes Father     Hypertension Mother     Diabetes Mother      Lab on 2023   Component Date Value Ref Range Status    H pylori Ag, Stl 2023 Negative  Negative Final      Polysomnography 4 or More Parameters  [facility]  [address] [place] [address]  Phone: [phone]  Fax: [phone]    Diagnostic Polysomnography Interpretation    Patient's Name: Christ Arias   YOB: 1995  Date of Study: 2022  Referring provider: MARIE Astudillo  Primary Care Provider: Lawrence Rangel MD    History Of Present Illness:  Ms. Christ Arias is a 27 y.o. female   with a There is no height or weight on file to calculate BMI.  Concurrent   medical history includes HTN.  Concurrent medications include Losartan.    Patient presents for a polysomnogram for complaints of snoring, witnessed    apneas, excessive daytime sleepiness and unrefreshing sleep.  Non   diagnostic home sleep testing.    Polysomnography:  The patient's sleep was [facility].  Sleep was monitored in accordance with   recommended AASM guidelines.  The recording also included oral/nasal   airflow, chest and abdominal respiratory effort, nasal pressure, single   channel EKG, intercostal EMG, bilateral tibialis EMG, and oxygen   saturation (by pulse oximeter).    Technical Comments:  Technically adequate study.     EEG & Sleep:  --There was no evidence of parasomnias (night terrors, sleepwalking, etc.)   on study night.   --No noted EEG abnormalities.     --Sleep onset occurred after 6 minutes, diminished.   --Sleep efficiency was 93%; expected is >80% in age 65 and less.    --Total sleep time was 433 minutes.    --Wake after sleep onset was 28 minutes.     --Sleep architecture:   --NREM and REM sleep were noted.    --REM latency of 96 min, unremarkable.    --Four cycles of sleep were observed.                --Sleep stages as a percent of total sleep time during this   study:           --> Stage N1 sleep: 03%; expected: ~ 5%           --> Stage N2 sleep: 64%; expected: ~ 50%           --> Stage N3 sleep: 15%; expected: ~ 20%           --> Stage REM sleep: 18%; expected: ~ 20% - 25%    Respiratory:  --Snoring was noted during the study (for 11% of total sleep time).  --Supine sleep was observed for 41% of the study.     --The overall apnea-hypopnea index (AHI) was 03 / hr which includes only   apneas and hypopneas with 4% desaturations noted.     --OAHI:  03 / hr.     --MELANIE:  0 / hr.     --REM AHI:  08 / hr.     --No significant positional component was observed.     --Mean oxygen saturation asleep was 96%.    --The oxygen phoebe was 90%.      Arousals:   --The overall arousal index was 09 / hr.           Cardiac/EKG:   --Normal sinus rhythm without significant ectopy.    --Mean heart rate of  "80 bpm.  Range of 64 bpm to 102 bpm.      EMG / Periodic Limb Movements:    --The periodic limb movement index was 02 / hr, with 0.3 / hr leading to   arousal.   --Expected index is < 15 / hr.      Study Conditions:   --Head of the bed: flat  --Supplemental oxygen: none  --Medications used on night of study: none specified    Subjective:   --The post sleep study questionnaire indicated the following:   --> How refreshing was your sleep last night?:  \"Moderately\"   --> How satisfactory was your sleep last night?:  \"Moderately\"   --> How did your sleep compare to home: \"Same\"    Assessment:  Ms. Christ Arias is a 27 y.o. female whose   polysomnogram reveals:   1. That although some respiratory events were noted, the number of overall   events is not sufficient to diagnose sleep disordered   breathing/obstructive sleep apnea; this test is non-diagnostic.   2. No nocturnal hypoxia.  3. Infrequent snoring.    Planning & Recommendations:  1. Follow-up with the [facility] Medicine Clinic for full discussion of results   and treatment planning.  2. Low threshold for future testing for any worsening signs or symptoms.   3. Call with any questions or concerns.    Nabil Patel II, MD  Sleep Medicine  04/30/22 @ 17:15 CDT    CC:     Lawrence Rangel MD Hickerson, Ashleigh, APRN      @[email or web address]  Immunization History   Administered Date(s) Administered    Tdap 03/25/2022     The following portions of the patient's history were reviewed and updated as appropriate: allergies, current medications, past family history, past medical history, past social history, past surgical history and problem list.    PHQ-9 Total Score:           Physical Exam  Constitutional:       Appearance: Normal appearance.   HENT:      Head: Normocephalic and atraumatic.      Right Ear: External ear normal.      Left Ear: External ear normal.   Eyes:      General:         Right eye: No discharge.         Left eye: No discharge.      " Conjunctiva/sclera: Conjunctivae normal.   Cardiovascular:      Rate and Rhythm: Normal rate and regular rhythm.      Pulses: Normal pulses.      Heart sounds: Normal heart sounds. No murmur heard.  Pulmonary:      Effort: Pulmonary effort is normal. No respiratory distress.      Breath sounds: Normal breath sounds.   Abdominal:      General: There is no distension.      Palpations: Abdomen is soft.      Tenderness: There is no abdominal tenderness.   Musculoskeletal:      Cervical back: Normal range of motion.      Right lower leg: No edema.      Left lower leg: No edema.   Lymphadenopathy:      Cervical: No cervical adenopathy.   Neurological:      Mental Status: She is alert. Mental status is at baseline.   Psychiatric:         Mood and Affect: Mood normal.         Behavior: Behavior normal.       Assessment & Plan    Diagnosis Plan   1. Gastroesophageal reflux disease, unspecified whether esophagitis present  Ambulatory Referral to Gastroenterology      2. Essential hypertension  losartan (COZAAR) 25 MG tablet      3. Irritable bowel syndrome with diarrhea           Orders Placed This Encounter   Procedures    Ambulatory Referral to Gastroenterology     Referral Priority:   Routine     Referral Type:   Consultation     Referral Reason:   Specialty Services Required     Requested Specialty:   Gastroenterology     Number of Visits Requested:   1     GERD; currently on Omeprazole 40 mg daily, no issues with medication, symptoms poorly controlled.  No reported red flags, exam reassuring.  After discussion with patient, will refer to Gastroenterology, also suspect a component of IBS as well.     Hypertension; does not have issues with current medications, blood pressure has been uncontrolled.  Discussed importance of blood pressure control, risk of uncontrolled hypertension, continue to work on diet, exercise, weight loss.  After discussion, will continue current management.  Follow-up in 6 months with blood pressure  log or sooner if needed.  Given strict ER/cardiac precautions.            This document has been electronically signed by Lawrence Rangel MD on September 29, 2023 10:07 CDT    EMR Dragon/Transciption Disclaimer: Some of this note may be an electronic transcription/translation of spoken language to printed text.  The electronic translation of spoken language may permit erroneous, or at times, nonsensical words or phrases to be inadvertently transcribed. Although I have reviewed the note for such errors, some may still exist.